# Patient Record
Sex: MALE | Race: BLACK OR AFRICAN AMERICAN | URBAN - METROPOLITAN AREA
[De-identification: names, ages, dates, MRNs, and addresses within clinical notes are randomized per-mention and may not be internally consistent; named-entity substitution may affect disease eponyms.]

---

## 2018-02-02 ENCOUNTER — APPOINTMENT (OUTPATIENT)
Dept: GENERAL RADIOLOGY | Age: 58
DRG: 246 | End: 2018-02-02

## 2018-02-02 ENCOUNTER — HOSPITAL ENCOUNTER (INPATIENT)
Age: 58
LOS: 5 days | Discharge: HOME | DRG: 246 | End: 2018-02-07
Attending: EMERGENCY MEDICINE | Admitting: INTERNAL MEDICINE

## 2018-02-02 DIAGNOSIS — R57.0 CARDIOGENIC SHOCK (HCC): ICD-10-CM

## 2018-02-02 DIAGNOSIS — I21.02 ST ELEVATION MYOCARDIAL INFARCTION INVOLVING LEFT ANTERIOR DESCENDING (LAD) CORONARY ARTERY (HCC): ICD-10-CM

## 2018-02-02 DIAGNOSIS — I21.09 ACUTE ST ELEVATION MYOCARDIAL INFARCTION (STEMI) OF ANTEROLATERAL WALL (HCC): Primary | ICD-10-CM

## 2018-02-02 PROBLEM — I21.3 STEMI (ST ELEVATION MYOCARDIAL INFARCTION) (HCC): Status: ACTIVE | Noted: 2018-02-02

## 2018-02-02 LAB
% CKMB: 1.2 % (ref 0–3.5)
-: ABNORMAL
ABSOLUTE EOS #: 0.09 K/UL (ref 0–0.4)
ABSOLUTE IMMATURE GRANULOCYTE: ABNORMAL K/UL (ref 0–0.3)
ABSOLUTE LYMPH #: 3.04 K/UL (ref 1–4.8)
ABSOLUTE MONO #: 0.37 K/UL (ref 0.2–0.8)
ALBUMIN SERPL-MCNC: 3.4 G/DL (ref 3.5–5.2)
ALBUMIN/GLOBULIN RATIO: ABNORMAL (ref 1–2.5)
ALP BLD-CCNC: 68 U/L (ref 40–129)
ALT SERPL-CCNC: 54 U/L (ref 5–41)
AMORPHOUS: ABNORMAL
ANION GAP SERPL CALCULATED.3IONS-SCNC: 16 MMOL/L (ref 9–17)
ANION GAP SERPL CALCULATED.3IONS-SCNC: 17 MMOL/L (ref 9–17)
ANION GAP: 22 MMOL/L (ref 8–16)
AST SERPL-CCNC: 216 U/L
BACTERIA: ABNORMAL
BASOPHILS # BLD: 0 %
BASOPHILS ABSOLUTE: 0 K/UL (ref 0–0.2)
BILIRUB SERPL-MCNC: 0.46 MG/DL (ref 0.3–1.2)
BILIRUBIN URINE: NEGATIVE
BNP INTERPRETATION: NORMAL
BUN BLDV-MCNC: 12 MG/DL (ref 6–20)
BUN BLDV-MCNC: 12 MG/DL (ref 6–20)
BUN/CREAT BLD: 11 (ref 9–20)
BUN/CREAT BLD: 8 (ref 9–20)
CALCIUM SERPL-MCNC: 7.2 MG/DL (ref 8.6–10.4)
CALCIUM SERPL-MCNC: 8.7 MG/DL (ref 8.6–10.4)
CASTS UA: ABNORMAL /LPF
CHLORIDE BLD-SCNC: 101 MMOL/L (ref 98–107)
CHLORIDE BLD-SCNC: 105 MMOL/L (ref 98–107)
CK MB: 4.5 NG/ML
CKMB INTERPRETATION: ABNORMAL
CO2: 20 MMOL/L (ref 20–31)
CO2: 23 MMOL/L (ref 20–31)
COLOR: YELLOW
COMMENT UA: ABNORMAL
CREAT SERPL-MCNC: 1.14 MG/DL (ref 0.7–1.2)
CREAT SERPL-MCNC: 1.47 MG/DL (ref 0.7–1.2)
CRYSTALS, UA: ABNORMAL /HPF
DIFFERENTIAL TYPE: ABNORMAL
EKG ATRIAL RATE: 92 BPM
EKG P AXIS: 78 DEGREES
EKG P-R INTERVAL: 180 MS
EKG Q-T INTERVAL: 400 MS
EKG QRS DURATION: 156 MS
EKG QTC CALCULATION (BAZETT): 494 MS
EKG R AXIS: 90 DEGREES
EKG T AXIS: 55 DEGREES
EKG VENTRICULAR RATE: 92 BPM
EOSINOPHILS RELATIVE PERCENT: 2 % (ref 1–4)
EPITHELIAL CELLS UA: ABNORMAL /HPF (ref 0–5)
FIBRINOGEN: 276 MG/DL (ref 170–400)
FIO2: 100
FIO2: 100
GFR AFRICAN AMERICAN: 60 ML/MIN
GFR AFRICAN AMERICAN: >60 ML/MIN
GFR NON-AFRICAN AMERICAN: 49 ML/MIN
GFR NON-AFRICAN AMERICAN: >60 ML/MIN
GFR SERPL CREATININE-BSD FRML MDRD: ABNORMAL ML/MIN/{1.73_M2}
GLUCOSE BLD-MCNC: 139 MG/DL (ref 74–106)
GLUCOSE BLD-MCNC: 140 MG/DL (ref 70–99)
GLUCOSE BLD-MCNC: 217 MG/DL (ref 74–106)
GLUCOSE BLD-MCNC: 233 MG/DL (ref 70–99)
GLUCOSE URINE: NEGATIVE
HCT VFR BLD CALC: 42.5 % (ref 41–53)
HEMOGLOBIN: 13.7 G/DL (ref 13.5–17.5)
IMMATURE GRANULOCYTES: ABNORMAL %
INR BLD: 1.1
KETONES, URINE: ABNORMAL
LACTIC ACID, WHOLE BLOOD: ABNORMAL MMOL/L (ref 0.7–2.1)
LACTIC ACID: 2.3 MMOL/L (ref 0.5–2.2)
LEUKOCYTE ESTERASE, URINE: NEGATIVE
LYMPHOCYTES # BLD: 66 % (ref 24–44)
MAGNESIUM: 1.7 MG/DL (ref 1.6–2.6)
MCH RBC QN AUTO: 30.4 PG (ref 26–34)
MCHC RBC AUTO-ENTMCNC: 32.3 G/DL (ref 31–37)
MCV RBC AUTO: 94.3 FL (ref 80–100)
MONOCYTES # BLD: 8 % (ref 1–7)
MUCUS: ABNORMAL
NEGATIVE BASE EXCESS, ART: 4 (ref 0–2)
NEGATIVE BASE EXCESS, ART: 5 (ref 0–2)
NITRITE, URINE: NEGATIVE
NRBC AUTOMATED: ABNORMAL PER 100 WBC
O2 DEVICE/FLOW/%: ABNORMAL
O2 DEVICE/FLOW/%: ABNORMAL
OTHER OBSERVATIONS UA: ABNORMAL
PARTIAL THROMBOPLASTIN TIME: 21.5 SEC (ref 23–31)
PATIENT TEMP: 97.2
PATIENT TEMP: ABNORMAL
PDW BLD-RTO: 13.7 % (ref 11.5–14.5)
PH UA: 5 (ref 5–8)
PLATELET # BLD: 212 K/UL (ref 130–400)
PLATELET ESTIMATE: ABNORMAL
PMV BLD AUTO: 9.3 FL (ref 6–12)
POC BUN: 12 MG/DL (ref 6–20)
POC CHLORIDE: 103 MMOL/L (ref 98–110)
POC CREATININE: 1.5 MG/DL (ref 0.6–1.4)
POC HCO3: 21.5 MMOL/L (ref 22–27)
POC HCO3: 21.5 MMOL/L (ref 22–27)
POC HEMATOCRIT: 40 % (ref 41–53)
POC HEMATOCRIT: 44 % (ref 41–53)
POC HEMOGLOBIN: 13.6 G/DL (ref 13.5–17.5)
POC HEMOGLOBIN: 15 G/DL (ref 13.5–17.5)
POC IONIZED CALCIUM: 1.05 MMOL/L (ref 1.13–1.33)
POC IONIZED CALCIUM: 1.08 MMOL/L (ref 1.13–1.33)
POC O2 SATURATION: 77 %
POC O2 SATURATION: 94 %
POC PCO2 TEMP: ABNORMAL MM HG
POC PCO2 TEMP: ABNORMAL MM HG
POC PCO2: 40 MM HG (ref 32–45)
POC PCO2: 46 MM HG (ref 32–45)
POC PH TEMP: ABNORMAL
POC PH TEMP: ABNORMAL
POC PH: 7.28 (ref 7.35–7.45)
POC PH: 7.34 (ref 7.35–7.45)
POC PO2 TEMP: ABNORMAL MM HG
POC PO2 TEMP: ABNORMAL MM HG
POC PO2: 47 MM HG (ref 75–95)
POC PO2: 75 MM HG (ref 75–95)
POC POTASSIUM: 3.2 MMOL/L (ref 3.5–5.1)
POC POTASSIUM: 3.3 MMOL/L (ref 3.5–5.1)
POC SODIUM: 143 MMOL/L (ref 136–145)
POC SODIUM: 144 MMOL/L (ref 136–145)
POC TCO2: 22 MMOL/L (ref 20–31)
POC TROPONIN I: 0 NG/ML (ref 0–0.1)
POC TROPONIN INTERP: NORMAL
POSITIVE BASE EXCESS, ART: ABNORMAL (ref 0–2)
POSITIVE BASE EXCESS, ART: ABNORMAL (ref 0–2)
POTASSIUM SERPL-SCNC: 3.3 MMOL/L (ref 3.7–5.3)
POTASSIUM SERPL-SCNC: 3.4 MMOL/L (ref 3.7–5.3)
PRO-BNP: 78 PG/ML
PROTEIN UA: ABNORMAL
PROTHROMBIN TIME: 11 SEC (ref 9.7–11.6)
RBC # BLD: 4.51 M/UL (ref 4.5–5.9)
RBC # BLD: ABNORMAL 10*6/UL
RBC UA: ABNORMAL /HPF (ref 0–2)
RENAL EPITHELIAL, UA: ABNORMAL /HPF
SEG NEUTROPHILS: 24 % (ref 36–66)
SEGMENTED NEUTROPHILS ABSOLUTE COUNT: 1.1 K/UL (ref 1.8–7.7)
SODIUM BLD-SCNC: 141 MMOL/L (ref 135–144)
SODIUM BLD-SCNC: 141 MMOL/L (ref 135–144)
SPECIFIC GRAVITY UA: 1.01 (ref 1–1.03)
TCO2 (CALC), ART: 23 MMOL/L (ref 23–28)
TCO2 (CALC), ART: 23 MMOL/L (ref 23–28)
TOTAL CK: 387 U/L (ref 39–308)
TOTAL PROTEIN: 5.6 G/DL (ref 6.4–8.3)
TRICHOMONAS: ABNORMAL
TROPONIN INTERP: ABNORMAL
TROPONIN INTERP: NORMAL
TROPONIN T: 2.97 NG/ML
TROPONIN T: <0.03 NG/ML
TURBIDITY: ABNORMAL
URINE HGB: ABNORMAL
UROBILINOGEN, URINE: NORMAL
WBC # BLD: 4.6 K/UL (ref 3.5–11)
WBC # BLD: ABNORMAL 10*3/UL
WBC UA: ABNORMAL /HPF (ref 0–5)
YEAST: ABNORMAL

## 2018-02-02 PROCEDURE — 80047 BASIC METABLC PNL IONIZED CA: CPT

## 2018-02-02 PROCEDURE — B2111ZZ FLUOROSCOPY OF MULTIPLE CORONARY ARTERIES USING LOW OSMOLAR CONTRAST: ICD-10-PCS | Performed by: INTERNAL MEDICINE

## 2018-02-02 PROCEDURE — 82550 ASSAY OF CK (CPK): CPT

## 2018-02-02 PROCEDURE — 36415 COLL VENOUS BLD VENIPUNCTURE: CPT

## 2018-02-02 PROCEDURE — 85384 FIBRINOGEN ACTIVITY: CPT

## 2018-02-02 PROCEDURE — 6370000000 HC RX 637 (ALT 250 FOR IP): Performed by: INTERNAL MEDICINE

## 2018-02-02 PROCEDURE — 80048 BASIC METABOLIC PNL TOTAL CA: CPT

## 2018-02-02 PROCEDURE — 82553 CREATINE MB FRACTION: CPT

## 2018-02-02 PROCEDURE — 94761 N-INVAS EAR/PLS OXIMETRY MLT: CPT

## 2018-02-02 PROCEDURE — 83735 ASSAY OF MAGNESIUM: CPT

## 2018-02-02 PROCEDURE — 86920 COMPATIBILITY TEST SPIN: CPT

## 2018-02-02 PROCEDURE — 85014 HEMATOCRIT: CPT

## 2018-02-02 PROCEDURE — 82947 ASSAY GLUCOSE BLOOD QUANT: CPT

## 2018-02-02 PROCEDURE — 94770 HC ETCO2 MONITOR DAILY: CPT

## 2018-02-02 PROCEDURE — 4A023N7 MEASUREMENT OF CARDIAC SAMPLING AND PRESSURE, LEFT HEART, PERCUTANEOUS APPROACH: ICD-10-PCS | Performed by: INTERNAL MEDICINE

## 2018-02-02 PROCEDURE — 2500000003 HC RX 250 WO HCPCS

## 2018-02-02 PROCEDURE — 71045 X-RAY EXAM CHEST 1 VIEW: CPT

## 2018-02-02 PROCEDURE — 027034Z DILATION OF CORONARY ARTERY, ONE ARTERY WITH DRUG-ELUTING INTRALUMINAL DEVICE, PERCUTANEOUS APPROACH: ICD-10-PCS | Performed by: INTERNAL MEDICINE

## 2018-02-02 PROCEDURE — 86900 BLOOD TYPING SEROLOGIC ABO: CPT

## 2018-02-02 PROCEDURE — 36556 INSERT NON-TUNNEL CV CATH: CPT

## 2018-02-02 PROCEDURE — 6360000002 HC RX W HCPCS: Performed by: EMERGENCY MEDICINE

## 2018-02-02 PROCEDURE — 93458 L HRT ARTERY/VENTRICLE ANGIO: CPT | Performed by: INTERNAL MEDICINE

## 2018-02-02 PROCEDURE — 93005 ELECTROCARDIOGRAM TRACING: CPT

## 2018-02-02 PROCEDURE — 2580000003 HC RX 258: Performed by: INTERNAL MEDICINE

## 2018-02-02 PROCEDURE — 84132 ASSAY OF SERUM POTASSIUM: CPT

## 2018-02-02 PROCEDURE — 2580000003 HC RX 258

## 2018-02-02 PROCEDURE — 6360000002 HC RX W HCPCS

## 2018-02-02 PROCEDURE — 85730 THROMBOPLASTIN TIME PARTIAL: CPT

## 2018-02-02 PROCEDURE — 6360000002 HC RX W HCPCS: Performed by: INTERNAL MEDICINE

## 2018-02-02 PROCEDURE — 02HV33Z INSERTION OF INFUSION DEVICE INTO SUPERIOR VENA CAVA, PERCUTANEOUS APPROACH: ICD-10-PCS | Performed by: INTERNAL MEDICINE

## 2018-02-02 PROCEDURE — 85610 PROTHROMBIN TIME: CPT

## 2018-02-02 PROCEDURE — 2000000000 HC ICU R&B

## 2018-02-02 PROCEDURE — 96374 THER/PROPH/DIAG INJ IV PUSH: CPT

## 2018-02-02 PROCEDURE — 92941 PRQ TRLML REVSC TOT OCCL AMI: CPT | Performed by: INTERNAL MEDICINE

## 2018-02-02 PROCEDURE — 0BH17EZ INSERTION OF ENDOTRACHEAL AIRWAY INTO TRACHEA, VIA NATURAL OR ARTIFICIAL OPENING: ICD-10-PCS | Performed by: INTERNAL MEDICINE

## 2018-02-02 PROCEDURE — 2500000003 HC RX 250 WO HCPCS: Performed by: INTERNAL MEDICINE

## 2018-02-02 PROCEDURE — 36620 INSERTION CATHETER ARTERY: CPT

## 2018-02-02 PROCEDURE — 84295 ASSAY OF SERUM SODIUM: CPT

## 2018-02-02 PROCEDURE — 83880 ASSAY OF NATRIURETIC PEPTIDE: CPT

## 2018-02-02 PROCEDURE — 2580000003 HC RX 258: Performed by: EMERGENCY MEDICINE

## 2018-02-02 PROCEDURE — 82803 BLOOD GASES ANY COMBINATION: CPT

## 2018-02-02 PROCEDURE — 36600 WITHDRAWAL OF ARTERIAL BLOOD: CPT

## 2018-02-02 PROCEDURE — 6360000004 HC RX CONTRAST MEDICATION

## 2018-02-02 PROCEDURE — 86850 RBC ANTIBODY SCREEN: CPT

## 2018-02-02 PROCEDURE — B2151ZZ FLUOROSCOPY OF LEFT HEART USING LOW OSMOLAR CONTRAST: ICD-10-PCS | Performed by: INTERNAL MEDICINE

## 2018-02-02 PROCEDURE — 5A1945Z RESPIRATORY VENTILATION, 24-96 CONSECUTIVE HOURS: ICD-10-PCS | Performed by: INTERNAL MEDICINE

## 2018-02-02 PROCEDURE — 99285 EMERGENCY DEPT VISIT HI MDM: CPT

## 2018-02-02 PROCEDURE — 80053 COMPREHEN METABOLIC PANEL: CPT

## 2018-02-02 PROCEDURE — 84484 ASSAY OF TROPONIN QUANT: CPT

## 2018-02-02 PROCEDURE — 82330 ASSAY OF CALCIUM: CPT

## 2018-02-02 PROCEDURE — 94002 VENT MGMT INPAT INIT DAY: CPT

## 2018-02-02 PROCEDURE — 86901 BLOOD TYPING SEROLOGIC RH(D): CPT

## 2018-02-02 PROCEDURE — 81001 URINALYSIS AUTO W/SCOPE: CPT

## 2018-02-02 PROCEDURE — 37799 UNLISTED PX VASCULAR SURGERY: CPT

## 2018-02-02 PROCEDURE — 85025 COMPLETE CBC W/AUTO DIFF WBC: CPT

## 2018-02-02 PROCEDURE — 83605 ASSAY OF LACTIC ACID: CPT

## 2018-02-02 RX ORDER — PROPOFOL 10 MG/ML
10 INJECTION, EMULSION INTRAVENOUS
Status: DISCONTINUED | OUTPATIENT
Start: 2018-02-02 | End: 2018-02-09 | Stop reason: HOSPADM

## 2018-02-02 RX ORDER — IPRATROPIUM BROMIDE AND ALBUTEROL SULFATE 2.5; .5 MG/3ML; MG/3ML
1 SOLUTION RESPIRATORY (INHALATION) 4 TIMES DAILY
Status: DISCONTINUED | OUTPATIENT
Start: 2018-02-02 | End: 2018-02-09 | Stop reason: HOSPADM

## 2018-02-02 RX ORDER — CARVEDILOL 3.12 MG/1
3.12 TABLET ORAL 2 TIMES DAILY WITH MEALS
Status: DISCONTINUED | OUTPATIENT
Start: 2018-02-02 | End: 2018-02-02

## 2018-02-02 RX ORDER — SODIUM CHLORIDE 0.9 % (FLUSH) 0.9 %
10 SYRINGE (ML) INJECTION PRN
Status: DISCONTINUED | OUTPATIENT
Start: 2018-02-02 | End: 2018-02-05 | Stop reason: SDUPTHER

## 2018-02-02 RX ORDER — ONDANSETRON 2 MG/ML
4 INJECTION INTRAMUSCULAR; INTRAVENOUS EVERY 6 HOURS PRN
Status: DISCONTINUED | OUTPATIENT
Start: 2018-02-02 | End: 2018-02-09 | Stop reason: HOSPADM

## 2018-02-02 RX ORDER — METHYLPREDNISOLONE SODIUM SUCCINATE 40 MG/ML
40 INJECTION, POWDER, LYOPHILIZED, FOR SOLUTION INTRAMUSCULAR; INTRAVENOUS EVERY 6 HOURS
Status: DISCONTINUED | OUTPATIENT
Start: 2018-02-02 | End: 2018-02-03

## 2018-02-02 RX ORDER — CALCIUM CHLORIDE 100 MG/ML
INJECTION INTRAVENOUS; INTRAVENTRICULAR
Status: COMPLETED
Start: 2018-02-02 | End: 2018-02-02

## 2018-02-02 RX ORDER — CALCIUM CHLORIDE 100 MG/ML
1 INJECTION INTRAVENOUS; INTRAVENTRICULAR ONCE
Status: COMPLETED | OUTPATIENT
Start: 2018-02-02 | End: 2018-02-02

## 2018-02-02 RX ORDER — ACETAMINOPHEN 325 MG/1
650 TABLET ORAL EVERY 4 HOURS PRN
Status: DISCONTINUED | OUTPATIENT
Start: 2018-02-02 | End: 2018-02-09 | Stop reason: HOSPADM

## 2018-02-02 RX ORDER — ASPIRIN 81 MG/1
81 TABLET, CHEWABLE ORAL DAILY
Status: DISCONTINUED | OUTPATIENT
Start: 2018-02-03 | End: 2018-02-02 | Stop reason: SDUPTHER

## 2018-02-02 RX ORDER — ONDANSETRON 2 MG/ML
4 INJECTION INTRAMUSCULAR; INTRAVENOUS EVERY 6 HOURS PRN
Status: DISCONTINUED | OUTPATIENT
Start: 2018-02-02 | End: 2018-02-05 | Stop reason: SDUPTHER

## 2018-02-02 RX ORDER — SODIUM CHLORIDE 9 MG/ML
INJECTION, SOLUTION INTRAVENOUS CONTINUOUS
Status: DISCONTINUED | OUTPATIENT
Start: 2018-02-02 | End: 2018-02-08

## 2018-02-02 RX ORDER — SODIUM CHLORIDE 0.9 % (FLUSH) 0.9 %
10 SYRINGE (ML) INJECTION EVERY 12 HOURS SCHEDULED
Status: DISCONTINUED | OUTPATIENT
Start: 2018-02-02 | End: 2018-02-05 | Stop reason: SDUPTHER

## 2018-02-02 RX ORDER — 0.9 % SODIUM CHLORIDE 0.9 %
2000 INTRAVENOUS SOLUTION INTRAVENOUS ONCE
Status: COMPLETED | OUTPATIENT
Start: 2018-02-02 | End: 2018-02-02

## 2018-02-02 RX ORDER — SODIUM CHLORIDE 0.9 % (FLUSH) 0.9 %
10 SYRINGE (ML) INJECTION EVERY 12 HOURS SCHEDULED
Status: DISCONTINUED | OUTPATIENT
Start: 2018-02-02 | End: 2018-02-09 | Stop reason: HOSPADM

## 2018-02-02 RX ORDER — ACETAMINOPHEN 325 MG/1
650 TABLET ORAL EVERY 4 HOURS PRN
Status: DISCONTINUED | OUTPATIENT
Start: 2018-02-02 | End: 2018-02-05 | Stop reason: SDUPTHER

## 2018-02-02 RX ORDER — FENTANYL CITRATE 50 UG/ML
50 INJECTION, SOLUTION INTRAMUSCULAR; INTRAVENOUS
Status: DISCONTINUED | OUTPATIENT
Start: 2018-02-02 | End: 2018-02-09 | Stop reason: HOSPADM

## 2018-02-02 RX ORDER — MIDAZOLAM HYDROCHLORIDE 1 MG/ML
INJECTION INTRAMUSCULAR; INTRAVENOUS
Status: COMPLETED
Start: 2018-02-02 | End: 2018-02-02

## 2018-02-02 RX ORDER — LOSARTAN POTASSIUM 25 MG/1
25 TABLET ORAL DAILY
Status: DISCONTINUED | OUTPATIENT
Start: 2018-02-03 | End: 2018-02-07

## 2018-02-02 RX ORDER — BUMETANIDE 0.25 MG/ML
1 INJECTION, SOLUTION INTRAMUSCULAR; INTRAVENOUS ONCE
Status: COMPLETED | OUTPATIENT
Start: 2018-02-02 | End: 2018-02-02

## 2018-02-02 RX ORDER — PROPOFOL 10 MG/ML
INJECTION, EMULSION INTRAVENOUS
Status: COMPLETED
Start: 2018-02-02 | End: 2018-02-02

## 2018-02-02 RX ORDER — ASPIRIN 81 MG/1
81 TABLET ORAL DAILY
Status: DISCONTINUED | OUTPATIENT
Start: 2018-02-02 | End: 2018-02-09 | Stop reason: HOSPADM

## 2018-02-02 RX ORDER — CALCIUM CHLORIDE 100 MG/ML
INJECTION INTRAVENOUS; INTRAVENTRICULAR
Status: DISPENSED
Start: 2018-02-02 | End: 2018-02-03

## 2018-02-02 RX ORDER — ATORVASTATIN CALCIUM 40 MG/1
40 TABLET, FILM COATED ORAL NIGHTLY
Status: DISCONTINUED | OUTPATIENT
Start: 2018-02-02 | End: 2018-02-09 | Stop reason: HOSPADM

## 2018-02-02 RX ORDER — 0.9 % SODIUM CHLORIDE 0.9 %
500 INTRAVENOUS SOLUTION INTRAVENOUS ONCE
Status: DISCONTINUED | OUTPATIENT
Start: 2018-02-02 | End: 2018-02-09 | Stop reason: HOSPADM

## 2018-02-02 RX ORDER — DOPAMINE HYDROCHLORIDE 160 MG/100ML
2.5 INJECTION, SOLUTION INTRAVENOUS CONTINUOUS
Status: DISCONTINUED | OUTPATIENT
Start: 2018-02-02 | End: 2018-02-09 | Stop reason: HOSPADM

## 2018-02-02 RX ORDER — SODIUM CHLORIDE 0.9 % (FLUSH) 0.9 %
10 SYRINGE (ML) INJECTION PRN
Status: DISCONTINUED | OUTPATIENT
Start: 2018-02-02 | End: 2018-02-09 | Stop reason: HOSPADM

## 2018-02-02 RX ORDER — POTASSIUM CHLORIDE 7.45 MG/ML
10 INJECTION INTRAVENOUS ONCE
Status: DISCONTINUED | OUTPATIENT
Start: 2018-02-02 | End: 2018-02-02 | Stop reason: SDUPTHER

## 2018-02-02 RX ORDER — HEPARIN SODIUM 5000 [USP'U]/ML
5000 INJECTION, SOLUTION INTRAVENOUS; SUBCUTANEOUS ONCE
Status: COMPLETED | OUTPATIENT
Start: 2018-02-02 | End: 2018-02-02

## 2018-02-02 RX ORDER — POTASSIUM CHLORIDE 29.8 MG/ML
20 INJECTION INTRAVENOUS ONCE
Status: COMPLETED | OUTPATIENT
Start: 2018-02-02 | End: 2018-02-02

## 2018-02-02 RX ADMIN — TICAGRELOR 180 MG: 90 TABLET ORAL at 21:52

## 2018-02-02 RX ADMIN — Medication 10 ML: at 21:53

## 2018-02-02 RX ADMIN — CALCIUM CHLORIDE 1 G: 100 INJECTION, SOLUTION INTRAVENOUS at 19:31

## 2018-02-02 RX ADMIN — BUMETANIDE 1 MG: 0.25 INJECTION INTRAMUSCULAR; INTRAVENOUS at 21:51

## 2018-02-02 RX ADMIN — NOREPINEPHRINE BITARTRATE 12 MCG/MIN: 1 INJECTION, SOLUTION, CONCENTRATE INTRAVENOUS at 18:28

## 2018-02-02 RX ADMIN — HEPARIN SODIUM 5000 UNITS: 5000 INJECTION, SOLUTION INTRAVENOUS; SUBCUTANEOUS at 16:01

## 2018-02-02 RX ADMIN — METHYLPREDNISOLONE SODIUM SUCCINATE 40 MG: 40 INJECTION, POWDER, FOR SOLUTION INTRAMUSCULAR; INTRAVENOUS at 21:51

## 2018-02-02 RX ADMIN — DOPAMINE HYDROCHLORIDE 8 MCG/KG/MIN: 160 INJECTION, SOLUTION INTRAVENOUS at 18:25

## 2018-02-02 RX ADMIN — POTASSIUM CHLORIDE 10 MEQ: 2 INJECTION, SOLUTION, CONCENTRATE INTRAVENOUS at 17:00

## 2018-02-02 RX ADMIN — MIDAZOLAM HYDROCHLORIDE 2 MG: 1 INJECTION INTRAMUSCULAR; INTRAVENOUS at 19:30

## 2018-02-02 RX ADMIN — ASPIRIN 81 MG: 81 TABLET, COATED ORAL at 21:51

## 2018-02-02 RX ADMIN — MIDAZOLAM 2 MG: 1 INJECTION INTRAMUSCULAR; INTRAVENOUS at 19:30

## 2018-02-02 RX ADMIN — SODIUM CHLORIDE 0.12 MCG/KG/MIN: 9 INJECTION, SOLUTION INTRAVENOUS at 17:29

## 2018-02-02 RX ADMIN — SODIUM CHLORIDE: 9 INJECTION, SOLUTION INTRAVENOUS at 18:25

## 2018-02-02 RX ADMIN — CALCIUM CHLORIDE 1 G: 100 INJECTION INTRAVENOUS; INTRAVENTRICULAR at 19:31

## 2018-02-02 RX ADMIN — Medication 10 ML: at 21:52

## 2018-02-02 RX ADMIN — PROPOFOL 20 MCG/KG/MIN: 10 INJECTION, EMULSION INTRAVENOUS at 18:26

## 2018-02-02 RX ADMIN — PROPOFOL 25 MCG/KG/MIN: 10 INJECTION, EMULSION INTRAVENOUS at 22:13

## 2018-02-02 RX ADMIN — ATORVASTATIN CALCIUM 40 MG: 40 TABLET, FILM COATED ORAL at 21:51

## 2018-02-02 RX ADMIN — POTASSIUM CHLORIDE 20 MEQ: 29.8 INJECTION, SOLUTION INTRAVENOUS at 21:52

## 2018-02-02 RX ADMIN — AMIODARONE HYDROCHLORIDE 1 MG/MIN: 1.8 INJECTION, SOLUTION INTRAVENOUS at 18:00

## 2018-02-02 RX ADMIN — SODIUM CHLORIDE 2000 ML: 9 INJECTION, SOLUTION INTRAVENOUS at 16:01

## 2018-02-02 RX ADMIN — AMIODARONE HYDROCHLORIDE 1 MG/MIN: 1.8 INJECTION, SOLUTION INTRAVENOUS at 22:45

## 2018-02-02 RX ADMIN — TAZOBACTAM SODIUM AND PIPERACILLIN SODIUM 3.38 G: 375; 3 INJECTION, SOLUTION INTRAVENOUS at 21:52

## 2018-02-02 RX ADMIN — FENTANYL CITRATE 50 MCG: 50 INJECTION INTRAMUSCULAR; INTRAVENOUS at 21:50

## 2018-02-02 ASSESSMENT — PAIN SCALES - GENERAL: PAINLEVEL_OUTOF10: 6

## 2018-02-02 ASSESSMENT — PULMONARY FUNCTION TESTS
PIF_VALUE: 23
PIF_VALUE: 25

## 2018-02-02 NOTE — ED PROVIDER NOTES
Alcohol use Not on file    Drug use: Unknown    Sexual activity: Not on file     Other Topics Concern    Not on file     Social History Narrative    No narrative on file       SCREENINGS             PHYSICAL EXAM    (up to 7 for level 4, 8 or more for level 5)     ED Triage Vitals [02/02/18 1600]   BP Temp Temp src Pulse Resp SpO2 Height Weight   -- -- -- -- -- -- 6' 5\" (1.956 m) 245 lb (111.1 kg)       Physical Exam   Constitutional: He is oriented to person, place, and time. Anxious, distressed, diaphoretic and hypotensive. HENT:   Head: Normocephalic. Right Ear: External ear normal.   Left Ear: External ear normal.   Nose: Nose normal.   Mouth/Throat: Oropharynx is clear and moist.   Eyes: EOM are normal. Pupils are equal, round, and reactive to light. Neck: Neck supple. Cardiovascular: Regular rhythm. Tachycardia present. Exam reveals distant heart sounds. No murmur heard. Pulmonary/Chest: Effort normal and breath sounds normal. He has no rhonchi. He has no rales. Abdominal: Normal appearance. He exhibits no distension and no mass. There is no hepatosplenomegaly. There is no tenderness. Musculoskeletal: Normal range of motion. He exhibits no edema or tenderness. Neurological: He is alert and oriented to person, place, and time. No cranial nerve deficit. He exhibits normal muscle tone. Skin: Skin is warm. No rash noted. He is diaphoretic. No pallor. Nursing note and vitals reviewed. DIAGNOSTIC RESULTS     EKG: All EKG's are interpreted by the Emergency Department Physician who either signs or Co-signs this chart in the absence of a cardiologist.    Sinus rhythm with a rate of 92 beats a minute. Right bundle branch block pattern. Acute ST elevation in the anteriolateral and high lateral leads.     RADIOLOGY:   Non-plain film images such as CT, Ultrasound and MRI are read by the radiologist. Plain radiographic images are visualized and preliminarily interpreted by the emergency

## 2018-02-02 NOTE — ED NOTES
Bed: 24  Expected date: 2/2/18  Expected time: 3:54 PM  [facility]  Comments:  [facility]     Dandy Boot  02/02/18 1600

## 2018-02-02 NOTE — H&P
18  Cardiology History & Physical     Name:   Lenin Portillo :  1960   MRN:   [id number] Gender:   male   PCP:  No primary care provider on file. Age: 62 y.o. PCP Fax:  None     Hospital: [facility] Room Number: STA SKY/SKY      History Obtained From:  patient, ER and EMS records    Chief Complaint   Patient presents with    Chest Pain     History of Present Illness:    62 y. o. Black male who developed chest pain while running on the treadmill. According to the emergency department he presented to the ER within 15 minutes of developing the pain. The initial electrocardiogram showed diffuse ST elevation worrisome for an acute left main occlusion. Follow-up EKG was slightly improved showing resolution of the ST elevation in the inferior lateral leads. The patient is being sent emergently to the cardiac cath lab for cardiac catheterization. He continues to have pain. He did have some runs of nonsustained ventricular tachycardia and was started on amiodarone and a drip was initiated    History & System Review: Allergies as of 2018    (Not on File)       Prior to Admission medications    Not on File       Patient Active Problem List   Diagnosis    STEMI (ST elevation myocardial infarction) [facility])       He  has a past medical history of Dementia. He  has no past surgical history on file. His family history is not on file. He has no family status information on file.        Review of Systems  Unobtainable due to emergent nature presentation      Physical Examination:     BP 89/63   Ht 6' 5\" (1.956 m)   Wt 245 lb (111.1 kg)   BMI 29.05 kg/m²   CONSTITUTIONAL:  awake, alert, cooperative, in mild distress  HEAD: Atraumatic, normocephalic  EYES:  Lids and lashes normal, pupils equal, round and reactive to light, extra ocular muscles intact, sclera clear, conjunctiva normal  ENT:  Normocephalic, without obvious abnormality, atraumatic, sinuses nontender on palpation, external ears

## 2018-02-03 ENCOUNTER — APPOINTMENT (OUTPATIENT)
Dept: GENERAL RADIOLOGY | Age: 58
DRG: 246 | End: 2018-02-03

## 2018-02-03 PROBLEM — R57.0 CARDIOGENIC SHOCK (HCC): Status: ACTIVE | Noted: 2018-02-03

## 2018-02-03 PROBLEM — I47.20 VENTRICULAR TACHYCARDIA: Status: ACTIVE | Noted: 2018-02-03

## 2018-02-03 PROBLEM — Z87.891 FORMER SMOKER: Chronic | Status: ACTIVE | Noted: 2018-02-03

## 2018-02-03 PROBLEM — K72.00 SHOCK LIVER: Status: ACTIVE | Noted: 2018-02-03

## 2018-02-03 PROBLEM — E87.20 LACTIC ACIDOSIS: Status: ACTIVE | Noted: 2018-02-03

## 2018-02-03 LAB
ABSOLUTE EOS #: 0 K/UL (ref 0–0.4)
ABSOLUTE IMMATURE GRANULOCYTE: ABNORMAL K/UL (ref 0–0.3)
ABSOLUTE LYMPH #: 0.5 K/UL (ref 1–4.8)
ABSOLUTE MONO #: 0.3 K/UL (ref 0.2–0.8)
ALBUMIN SERPL-MCNC: 3.2 G/DL (ref 3.5–5.2)
ALBUMIN/GLOBULIN RATIO: ABNORMAL (ref 1–2.5)
ALP BLD-CCNC: 65 U/L (ref 40–129)
ALT SERPL-CCNC: 146 U/L (ref 5–41)
AMPHETAMINE SCREEN URINE: NEGATIVE
ANION GAP SERPL CALCULATED.3IONS-SCNC: 11 MMOL/L (ref 9–17)
AST SERPL-CCNC: 815 U/L
BARBITURATE SCREEN URINE: NEGATIVE
BASOPHILS # BLD: 0 % (ref 0–2)
BASOPHILS ABSOLUTE: 0 K/UL (ref 0–0.2)
BENZODIAZEPINE SCREEN, URINE: POSITIVE
BILIRUB SERPL-MCNC: 0.26 MG/DL (ref 0.3–1.2)
BILIRUBIN DIRECT: 0.09 MG/DL
BILIRUBIN, INDIRECT: 0.17 MG/DL (ref 0–1)
BUN BLDV-MCNC: 14 MG/DL (ref 6–20)
BUN/CREAT BLD: 11 (ref 9–20)
BUPRENORPHINE URINE: ABNORMAL
CALCIUM SERPL-MCNC: 8.6 MG/DL (ref 8.6–10.4)
CANNABINOID SCREEN URINE: NEGATIVE
CHLORIDE BLD-SCNC: 109 MMOL/L (ref 98–107)
CO2: 20 MMOL/L (ref 20–31)
COCAINE METABOLITE, URINE: NEGATIVE
CREAT SERPL-MCNC: 1.24 MG/DL (ref 0.7–1.2)
DIFFERENTIAL TYPE: ABNORMAL
EOSINOPHILS RELATIVE PERCENT: 0 % (ref 1–4)
FIO2: 100
FIO2: 70
FIO2: 90
GFR AFRICAN AMERICAN: >60 ML/MIN
GFR NON-AFRICAN AMERICAN: >60 ML/MIN
GFR SERPL CREATININE-BSD FRML MDRD: ABNORMAL ML/MIN/{1.73_M2}
GFR SERPL CREATININE-BSD FRML MDRD: ABNORMAL ML/MIN/{1.73_M2}
GLOBULIN: ABNORMAL G/DL (ref 1.5–3.8)
GLUCOSE BLD-MCNC: 100 MG/DL (ref 70–99)
HCT VFR BLD CALC: 43.3 % (ref 41–53)
HEMOGLOBIN: 14 G/DL (ref 13.5–17.5)
IMMATURE GRANULOCYTES: ABNORMAL %
LACTIC ACID: 2.3 MMOL/L (ref 0.5–2.2)
LV EF: 20 %
LVEF MODALITY: NORMAL
LYMPHOCYTES # BLD: 5 % (ref 24–44)
MAGNESIUM: 1.8 MG/DL (ref 1.6–2.6)
MAGNESIUM: 2 MG/DL (ref 1.6–2.6)
MCH RBC QN AUTO: 30.4 PG (ref 26–34)
MCHC RBC AUTO-ENTMCNC: 32.5 G/DL (ref 31–37)
MCV RBC AUTO: 93.6 FL (ref 80–100)
MDMA URINE: ABNORMAL
METHADONE SCREEN, URINE: NEGATIVE
METHAMPHETAMINE, URINE: ABNORMAL
MONOCYTES # BLD: 3 % (ref 1–7)
NEGATIVE BASE EXCESS, ART: 3 (ref 0–2)
NEGATIVE BASE EXCESS, ART: 3 (ref 0–2)
NEGATIVE BASE EXCESS, ART: 4 (ref 0–2)
NRBC AUTOMATED: ABNORMAL PER 100 WBC
O2 DEVICE/FLOW/%: ABNORMAL
OPIATES, URINE: POSITIVE
OXYCODONE SCREEN URINE: NEGATIVE
PATIENT TEMP: 97
PATIENT TEMP: 97.3
PATIENT TEMP: 97.7
PDW BLD-RTO: 13.7 % (ref 11.5–14.5)
PHENCYCLIDINE, URINE: NEGATIVE
PLATELET # BLD: 211 K/UL (ref 130–400)
PLATELET ESTIMATE: ABNORMAL
PMV BLD AUTO: 9.2 FL (ref 6–12)
POC HCO3: 20.9 MMOL/L (ref 22–27)
POC HCO3: 21.5 MMOL/L (ref 22–27)
POC HCO3: 21.9 MMOL/L (ref 22–27)
POC O2 SATURATION: 100 %
POC O2 SATURATION: 99 %
POC O2 SATURATION: 99 %
POC PCO2 TEMP: ABNORMAL MM HG
POC PCO2: 31 MM HG (ref 32–45)
POC PCO2: 34 MM HG (ref 32–45)
POC PCO2: 39 MM HG (ref 32–45)
POC PH TEMP: ABNORMAL
POC PH: 7.36 (ref 7.35–7.45)
POC PH: 7.42 (ref 7.35–7.45)
POC PH: 7.44 (ref 7.35–7.45)
POC PO2 TEMP: ABNORMAL MM HG
POC PO2: 133 MM HG (ref 75–95)
POC PO2: 155 MM HG (ref 75–95)
POC PO2: 208 MM HG (ref 75–95)
POSITIVE BASE EXCESS, ART: ABNORMAL (ref 0–2)
POTASSIUM SERPL-SCNC: 4.7 MMOL/L (ref 3.7–5.3)
POTASSIUM SERPL-SCNC: 5.6 MMOL/L (ref 3.7–5.3)
POTASSIUM SERPL-SCNC: 5.9 MMOL/L (ref 3.7–5.3)
PROPOXYPHENE, URINE: ABNORMAL
RBC # BLD: 4.62 M/UL (ref 4.5–5.9)
RBC # BLD: ABNORMAL 10*6/UL
SEG NEUTROPHILS: 92 % (ref 36–66)
SEGMENTED NEUTROPHILS ABSOLUTE COUNT: 10.1 K/UL (ref 1.8–7.7)
SODIUM BLD-SCNC: 140 MMOL/L (ref 135–144)
TCO2 (CALC), ART: 22 MMOL/L (ref 23–28)
TCO2 (CALC), ART: 23 MMOL/L (ref 23–28)
TCO2 (CALC), ART: 23 MMOL/L (ref 23–28)
TEST INFORMATION: ABNORMAL
TOTAL PROTEIN: 5.8 G/DL (ref 6.4–8.3)
TRICYCLIC ANTIDEPRESSANTS, UR: ABNORMAL
TROPONIN INTERP: ABNORMAL
TROPONIN T: 17.22 NG/ML
WBC # BLD: 11 K/UL (ref 3.5–11)
WBC # BLD: ABNORMAL 10*3/UL

## 2018-02-03 PROCEDURE — 80048 BASIC METABOLIC PNL TOTAL CA: CPT

## 2018-02-03 PROCEDURE — 83735 ASSAY OF MAGNESIUM: CPT

## 2018-02-03 PROCEDURE — 6360000002 HC RX W HCPCS: Performed by: INTERNAL MEDICINE

## 2018-02-03 PROCEDURE — 71045 X-RAY EXAM CHEST 1 VIEW: CPT

## 2018-02-03 PROCEDURE — 94640 AIRWAY INHALATION TREATMENT: CPT

## 2018-02-03 PROCEDURE — 99222 1ST HOSP IP/OBS MODERATE 55: CPT | Performed by: INTERNAL MEDICINE

## 2018-02-03 PROCEDURE — 6370000000 HC RX 637 (ALT 250 FOR IP): Performed by: INTERNAL MEDICINE

## 2018-02-03 PROCEDURE — 37799 UNLISTED PX VASCULAR SURGERY: CPT

## 2018-02-03 PROCEDURE — 94761 N-INVAS EAR/PLS OXIMETRY MLT: CPT

## 2018-02-03 PROCEDURE — 36415 COLL VENOUS BLD VENIPUNCTURE: CPT

## 2018-02-03 PROCEDURE — 85025 COMPLETE CBC W/AUTO DIFF WBC: CPT

## 2018-02-03 PROCEDURE — 2500000003 HC RX 250 WO HCPCS: Performed by: INTERNAL MEDICINE

## 2018-02-03 PROCEDURE — 2580000003 HC RX 258: Performed by: INTERNAL MEDICINE

## 2018-02-03 PROCEDURE — 80076 HEPATIC FUNCTION PANEL: CPT

## 2018-02-03 PROCEDURE — 2000000000 HC ICU R&B

## 2018-02-03 PROCEDURE — 93306 TTE W/DOPPLER COMPLETE: CPT

## 2018-02-03 PROCEDURE — 84484 ASSAY OF TROPONIN QUANT: CPT

## 2018-02-03 PROCEDURE — 93005 ELECTROCARDIOGRAM TRACING: CPT

## 2018-02-03 PROCEDURE — 94003 VENT MGMT INPAT SUBQ DAY: CPT

## 2018-02-03 PROCEDURE — 80307 DRUG TEST PRSMV CHEM ANLYZR: CPT

## 2018-02-03 PROCEDURE — 83605 ASSAY OF LACTIC ACID: CPT

## 2018-02-03 PROCEDURE — 84132 ASSAY OF SERUM POTASSIUM: CPT

## 2018-02-03 PROCEDURE — 82803 BLOOD GASES ANY COMBINATION: CPT

## 2018-02-03 PROCEDURE — 94770 HC ETCO2 MONITOR DAILY: CPT

## 2018-02-03 RX ORDER — SODIUM POLYSTYRENE SULFONATE 15 G/60ML
15 SUSPENSION ORAL; RECTAL ONCE
Status: COMPLETED | OUTPATIENT
Start: 2018-02-03 | End: 2018-02-03

## 2018-02-03 RX ORDER — DEXTROSE MONOHYDRATE 25 G/50ML
25 INJECTION, SOLUTION INTRAVENOUS PRN
Status: DISCONTINUED | OUTPATIENT
Start: 2018-02-03 | End: 2018-02-09 | Stop reason: HOSPADM

## 2018-02-03 RX ORDER — MIDAZOLAM HYDROCHLORIDE 1 MG/ML
2 INJECTION INTRAMUSCULAR; INTRAVENOUS ONCE
Status: COMPLETED | OUTPATIENT
Start: 2018-02-02 | End: 2018-02-02

## 2018-02-03 RX ORDER — METHYLPREDNISOLONE SODIUM SUCCINATE 40 MG/ML
40 INJECTION, POWDER, LYOPHILIZED, FOR SOLUTION INTRAMUSCULAR; INTRAVENOUS EVERY 8 HOURS
Status: DISCONTINUED | OUTPATIENT
Start: 2018-02-03 | End: 2018-02-04

## 2018-02-03 RX ORDER — SODIUM POLYSTYRENE SULFONATE 15 G/60ML
15 SUSPENSION ORAL; RECTAL ONCE
Status: DISCONTINUED | OUTPATIENT
Start: 2018-02-03 | End: 2018-02-09 | Stop reason: HOSPADM

## 2018-02-03 RX ADMIN — TAZOBACTAM SODIUM AND PIPERACILLIN SODIUM 3.38 G: 375; 3 INJECTION, SOLUTION INTRAVENOUS at 20:45

## 2018-02-03 RX ADMIN — AMIODARONE HYDROCHLORIDE 0.5 MG/MIN: 1.8 INJECTION, SOLUTION INTRAVENOUS at 08:33

## 2018-02-03 RX ADMIN — TAZOBACTAM SODIUM AND PIPERACILLIN SODIUM 3.38 G: 375; 3 INJECTION, SOLUTION INTRAVENOUS at 14:45

## 2018-02-03 RX ADMIN — FENTANYL CITRATE 50 MCG: 50 INJECTION INTRAMUSCULAR; INTRAVENOUS at 10:57

## 2018-02-03 RX ADMIN — PROPOFOL 40 MCG/KG/MIN: 10 INJECTION, EMULSION INTRAVENOUS at 10:58

## 2018-02-03 RX ADMIN — Medication 10 ML: at 20:46

## 2018-02-03 RX ADMIN — FENTANYL CITRATE 50 MCG: 50 INJECTION INTRAMUSCULAR; INTRAVENOUS at 02:28

## 2018-02-03 RX ADMIN — ASPIRIN 81 MG: 81 TABLET, COATED ORAL at 11:20

## 2018-02-03 RX ADMIN — TICAGRELOR 90 MG: 90 TABLET ORAL at 11:20

## 2018-02-03 RX ADMIN — TAZOBACTAM SODIUM AND PIPERACILLIN SODIUM 3.38 G: 375; 3 INJECTION, SOLUTION INTRAVENOUS at 05:31

## 2018-02-03 RX ADMIN — METHYLPREDNISOLONE SODIUM SUCCINATE 40 MG: 40 INJECTION, POWDER, FOR SOLUTION INTRAMUSCULAR; INTRAVENOUS at 14:29

## 2018-02-03 RX ADMIN — DEXTROSE MONOHYDRATE 25 G: 500 INJECTION PARENTERAL at 03:24

## 2018-02-03 RX ADMIN — DOPAMINE HYDROCHLORIDE 4 MCG/KG/MIN: 160 INJECTION, SOLUTION INTRAVENOUS at 05:56

## 2018-02-03 RX ADMIN — IPRATROPIUM BROMIDE AND ALBUTEROL SULFATE 1 AMPULE: .5; 3 SOLUTION RESPIRATORY (INHALATION) at 15:40

## 2018-02-03 RX ADMIN — AMIODARONE HYDROCHLORIDE 0.5 MG/MIN: 1.8 INJECTION, SOLUTION INTRAVENOUS at 20:48

## 2018-02-03 RX ADMIN — PROPOFOL 45 MCG/KG/MIN: 10 INJECTION, EMULSION INTRAVENOUS at 21:53

## 2018-02-03 RX ADMIN — ATORVASTATIN CALCIUM 40 MG: 40 TABLET, FILM COATED ORAL at 20:45

## 2018-02-03 RX ADMIN — PROPOFOL 45 MCG/KG/MIN: 10 INJECTION, EMULSION INTRAVENOUS at 18:13

## 2018-02-03 RX ADMIN — PROPOFOL 30 MCG/KG/MIN: 10 INJECTION, EMULSION INTRAVENOUS at 07:52

## 2018-02-03 RX ADMIN — AMIODARONE HYDROCHLORIDE 0.5 MG/MIN: 1.8 INJECTION, SOLUTION INTRAVENOUS at 00:44

## 2018-02-03 RX ADMIN — METHYLPREDNISOLONE SODIUM SUCCINATE 40 MG: 40 INJECTION, POWDER, FOR SOLUTION INTRAMUSCULAR; INTRAVENOUS at 03:24

## 2018-02-03 RX ADMIN — NOREPINEPHRINE BITARTRATE 12 MCG/MIN: 1 INJECTION, SOLUTION, CONCENTRATE INTRAVENOUS at 11:19

## 2018-02-03 RX ADMIN — DOPAMINE HYDROCHLORIDE 3 MCG/KG/MIN: 160 INJECTION, SOLUTION INTRAVENOUS at 23:06

## 2018-02-03 RX ADMIN — METHYLPREDNISOLONE SODIUM SUCCINATE 40 MG: 40 INJECTION, POWDER, FOR SOLUTION INTRAMUSCULAR; INTRAVENOUS at 11:21

## 2018-02-03 RX ADMIN — IPRATROPIUM BROMIDE AND ALBUTEROL SULFATE 1 AMPULE: .5; 3 SOLUTION RESPIRATORY (INHALATION) at 08:01

## 2018-02-03 RX ADMIN — METHYLPREDNISOLONE SODIUM SUCCINATE 40 MG: 40 INJECTION, POWDER, FOR SOLUTION INTRAMUSCULAR; INTRAVENOUS at 20:46

## 2018-02-03 RX ADMIN — PROPOFOL 45 MCG/KG/MIN: 10 INJECTION, EMULSION INTRAVENOUS at 14:27

## 2018-02-03 RX ADMIN — VASOPRESSIN 0.01 UNITS/MIN: 20 INJECTION INTRAVENOUS at 14:29

## 2018-02-03 RX ADMIN — TICAGRELOR 90 MG: 90 TABLET ORAL at 20:45

## 2018-02-03 RX ADMIN — INSULIN HUMAN 10 UNITS: 100 INJECTION, SOLUTION PARENTERAL at 03:24

## 2018-02-03 RX ADMIN — FENTANYL CITRATE 50 MCG: 50 INJECTION INTRAMUSCULAR; INTRAVENOUS at 07:23

## 2018-02-03 RX ADMIN — IPRATROPIUM BROMIDE AND ALBUTEROL SULFATE 1 AMPULE: .5; 3 SOLUTION RESPIRATORY (INHALATION) at 11:53

## 2018-02-03 RX ADMIN — IPRATROPIUM BROMIDE AND ALBUTEROL SULFATE 1 AMPULE: .5; 3 SOLUTION RESPIRATORY (INHALATION) at 20:05

## 2018-02-03 RX ADMIN — SODIUM POLYSTYRENE SULFONATE 15 G: 15 SUSPENSION ORAL; RECTAL at 14:28

## 2018-02-03 RX ADMIN — PROPOFOL 30 MCG/KG/MIN: 10 INJECTION, EMULSION INTRAVENOUS at 03:28

## 2018-02-03 ASSESSMENT — PAIN SCALES - GENERAL
PAINLEVEL_OUTOF10: 0
PAINLEVEL_OUTOF10: 4
PAINLEVEL_OUTOF10: 0
PAINLEVEL_OUTOF10: 1
PAINLEVEL_OUTOF10: 0
PAINLEVEL_OUTOF10: 0
PAINLEVEL_OUTOF10: 3

## 2018-02-03 ASSESSMENT — PULMONARY FUNCTION TESTS
PIF_VALUE: 19
PIF_VALUE: 19
PIF_VALUE: 26
PIF_VALUE: 19
PIF_VALUE: 18
PIF_VALUE: 18
PIF_VALUE: 19

## 2018-02-03 NOTE — FLOWSHEET NOTE
02/03/18 [address] [facility]; Araujo care;Shayla care; Bathed; Soap and water   Araujo Care Soap and water   Oral Care Mouth swabbed;Mouth suctioned;Suction toothette   Skin Care Foam skin cleanser; Other (Comment)  (Zinc paste to buttocks and powder to skin and folds)   Level of Assistance Dependent   Comfort and Environment Interventions   Comfort Bed pad changed;Draw sheet changed;Gown changed; Complete linen change; Lotion;Powder;Repositioned;Pain med; Other (Comment)  (all linens changed due to diaphoresis )     Patient very moist and diaphoretic this am.  All linens changed, including gown, socks, and restraints. Patient repositioned for comfort. Zinc paste to buttocks and powder to back and creases for protection. Family remains at bedside. Will continue to reposition as needed. Pillow support and towel under head to protect from moisture.

## 2018-02-03 NOTE — FLOWSHEET NOTE
02/03/18 1336   Provider Notification   Reason for Communication Evaluate; Review case   Provider Name Dr. Soo Delarosa   Provider Notification Physician   Method of Communication Face to face   Response At bedside   Notification Time 1336     Dr. Soo Delarosa at bedside. Spoke with family at length. Orders for Kayexalate 15 mg now, Start Vasopressin and start weaning down on Levophed. We are to titrate drips to a MAP of 60. Once pressors are off or decreased enough he would like to try to extubate. Will likely wait until tomorrow. No further orders at this time.

## 2018-02-03 NOTE — CONSULTS
Collection Time: 02/02/18  4:05 PM   Result Value Ref Range    POC Glucose 139 (H) 74 - 106 mg/dL    POC BUN 12 6 - 20 mg/dL    POC Creatinine 1.5 (H) 0.6 - 1.4 mg/dL    POC Sodium 144 136 - 145 mmol/L    POC Potassium 3.3 (L) 3.5 - 5.1 mmol/L    POC Chloride 103 98 - 110 mmol/L    POC TCO2 22 20 - 31 mmol/L    Anion Gap 22 (H) 8 - 16 mmol/L    POC Ionized Calcium 1.08 (L) 1.13 - 1.33 mmol/L   Hemoglobin and hematocrit, blood    Collection Time: 02/02/18  4:05 PM   Result Value Ref Range    POC Hemoglobin 15.0 13.5 - 17.5 g/dL    POC Hematocrit 44 41 - 53 %   EKG 12 Lead    Collection Time: 02/02/18  4:05 PM   Result Value Ref Range    Ventricular Rate 92 BPM    Atrial Rate 92 BPM    P-R Interval 180 ms    QRS Duration 156 ms    Q-T Interval 400 ms    QTc Calculation (Bazett) 494 ms    P Axis 78 degrees    R Axis 90 degrees    T Axis 55 degrees   CBC Auto Differential    Collection Time: 02/02/18  4:09 PM   Result Value Ref Range    WBC 4.6 3.5 - 11.0 k/uL    RBC 4.51 4.5 - 5.9 m/uL    Hemoglobin 13.7 13.5 - 17.5 g/dL    Hematocrit 42.5 41 - 53 %    MCV 94.3 80 - 100 fL    MCH 30.4 26 - 34 pg    MCHC 32.3 31 - 37 g/dL    RDW 13.7 11.5 - 14.5 %    Platelets 359 372 - 364 k/uL    MPV 9.3 6.0 - 12.0 fL    NRBC Automated NOT REPORTED per 100 WBC    Differential Type NOT REPORTED     Immature Granulocytes NOT REPORTED 0 %    Absolute Immature Granulocyte NOT REPORTED 0.00 - 0.30 k/uL    WBC Morphology NOT REPORTED     RBC Morphology NOT REPORTED     Platelet Estimate NOT REPORTED     Seg Neutrophils 24 (L) 36 - 66 %    Lymphocytes 66 (H) 24 - 44 %    Monocytes 8 (H) 1 - 7 %    Eosinophils % 2 1 - 4 %    Basophils 0 %    Segs Absolute 1.10 (L) 1.8 - 7.7 k/uL    Absolute Lymph # 3.04 1.0 - 4.8 k/uL    Absolute Mono # 0.37 0.2 - 0.8 k/uL    Absolute Eos # 0.09 0.0 - 0.4 k/uL    Basophils # 0.00 0.0 - 0.2 k/uL   CK isoenzymes    Collection Time: 02/02/18  4:09 PM   Result Value Ref Range    Total  (H) 39 - 308 U/L POC Glucose 217 (H) 74 - 106 mg/dL    POC Hematocrit 40 (L) 41 - 53 %    POC pH 7.28 (L) 7.35 - 7.45    POC pCO2 46 (H) 32 - 45 mm Hg    POC PO2 47 (LL) 75 - 95 mm Hg    TCO2 (calc), Art 23 23 - 28 mmol/L    POC HCO3 21.5 (L) 22 - 27 mmol/L    Positive Base Excess, Art NOT REPORTED 0.0 - 2.0    Negative Base Excess, Art 5 (H) 0.0 - 2.0    POC O2 SAT 77 %    POC Hemoglobin 13.6 13.5 - 17.5 g/dL    O2 Device/Flow/% NOT REPORTED     FIO2 100.0     Pt Temp NOT REPORTED     POC pH Temp NOT REPORTED     POC pCO2 Temp NOT REPORTED mm Hg    POC pO2 Temp NOT REPORTED mm Hg   Troponin    Collection Time: 02/02/18  6:35 PM   Result Value Ref Range    Troponin T 2.97 (HH) <0.03 ng/mL    Troponin Interp         Comprehensive Metabolic Panel w/ Reflex to MG    Collection Time: 02/02/18  6:35 PM   Result Value Ref Range    Glucose 233 (H) 70 - 99 mg/dL    BUN 12 6 - 20 mg/dL    CREATININE 1.14 0.70 - 1.20 mg/dL    Bun/Cre Ratio 11 9 - 20    Calcium 7.2 (L) 8.6 - 10.4 mg/dL    Sodium 141 135 - 144 mmol/L    Potassium 3.4 (L) 3.7 - 5.3 mmol/L    Chloride 105 98 - 107 mmol/L    CO2 20 20 - 31 mmol/L    Anion Gap 16 9 - 17 mmol/L    Alkaline Phosphatase 68 40 - 129 U/L    ALT 54 (H) 5 - 41 U/L     (H) <40 U/L    Total Bilirubin 0.46 0.3 - 1.2 mg/dL    Total Protein 5.6 (L) 6.4 - 8.3 g/dL    Alb 3.4 (L) 3.5 - 5.2 g/dL    Albumin/Globulin Ratio NOT REPORTED 1.0 - 2.5    GFR Non-African American >60 >60 mL/min    GFR African American >60 >60 mL/min    GFR Comment          GFR Staging NOT REPORTED    Lactic acid, plasma    Collection Time: 02/02/18  6:35 PM   Result Value Ref Range    Lactic Acid 2.3 (H) 0.5 - 2.2 mmol/L    Lactic Acid, Whole Blood NOT REPORTED 0.7 - 2.1 mmol/L   Magnesium    Collection Time: 02/02/18  6:35 PM   Result Value Ref Range    Magnesium 1.7 1.6 - 2.6 mg/dL   UA w/Reflex Culture    Collection Time: 02/02/18  6:52 PM   Result Value Ref Range    Color, UA YELLOW YEL    Turbidity UA SLIGHTLY CLOUDY (A) CLEAR Art 3 (H) 0.0 - 2.0    Positive Base Excess, Art NOT REPORTED 0.0 - 2.0    POC O2 SAT 99 %    Pt Temp 97.7     O2 Device/Flow/% NOT REPORTED     FIO2 100.0     POC pH Temp NOT REPORTED     POC pCO2 Temp NOT REPORTED mm Hg    POC pO2 Temp NOT REPORTED mm Hg   K (Potassium)    Collection Time: 02/03/18 12:22 AM   Result Value Ref Range    Potassium 5.9 (H) 3.7 - 5.3 mmol/L   Magnesium    Collection Time: 02/03/18 12:22 AM   Result Value Ref Range    Magnesium 1.8 1.6 - 2.6 mg/dL   Troponin    Collection Time: 02/03/18 12:22 AM   Result Value Ref Range    Troponin T 17.22 (HH) <0.03 ng/mL    Troponin Interp         K (Potassium)    Collection Time: 02/03/18  1:25 AM   Result Value Ref Range    Potassium 5.6 (H) 3.7 - 5.3 mmol/L   POC PANEL (G3)-ART    Collection Time: 02/03/18  2:19 AM   Result Value Ref Range    POC pH 7.36 7.35 - 7.45    POC pCO2 39 32 - 45 mm Hg    POC PO2 208 (H) 75 - 95 mm Hg    TCO2 (calc), Art 23 23 - 28 mmol/L    POC HCO3 21.5 (L) 22 - 27 mmol/L    Negative Base Excess, Art 4 (H) 0.0 - 2.0    Positive Base Excess, Art NOT REPORTED 0.0 - 2.0    POC O2  %    Pt Temp 97.3     O2 Device/Flow/% NOT REPORTED     FIO2 90.0     POC pH Temp NOT REPORTED     POC pCO2 Temp NOT REPORTED mm Hg    POC pO2 Temp NOT REPORTED mm Hg   Drug Scr, Abuse, Ur    Collection Time: 02/03/18  3:34 AM   Result Value Ref Range    Amphetamine Screen, Ur NEGATIVE NEG    Barbiturate Screen, Ur NEGATIVE NEG    Benzodiazepine Screen, Urine POSITIVE (A) NEG    Cocaine Metabolite, Urine NEGATIVE NEG    Methadone Screen, Urine NEGATIVE NEG    Opiates, Urine POSITIVE (A) NEG    Phencyclidine, Urine NEGATIVE NEG    Propoxyphene, Urine NOT REPORTED NEG    Cannabinoid Scrn, Ur NEGATIVE NEG    Oxycodone Screen, Ur NEGATIVE NEG    Methamphetamine, Urine NOT REPORTED NEG    Tricyclic Antidepressants, Ur NOT REPORTED NEG    MDMA URINE NOT REPORTED NEG    Buprenorphine Urine NOT REPORTED NEG    Test Information       Assay provides medical screening only.   The absence of expected drug(s) and/or   Basic Metabolic Prof    Collection Time: 02/03/18  4:50 AM   Result Value Ref Range    Glucose 100 (H) 70 - 99 mg/dL    BUN 14 6 - 20 mg/dL    CREATININE 1.24 (H) 0.70 - 1.20 mg/dL    Bun/Cre Ratio 11 9 - 20    Calcium 8.6 8.6 - 10.4 mg/dL    Sodium 140 135 - 144 mmol/L    Potassium 4.7 3.7 - 5.3 mmol/L    Chloride 109 (H) 98 - 107 mmol/L    CO2 20 20 - 31 mmol/L    Anion Gap 11 9 - 17 mmol/L    GFR Non-African American >60 >60 mL/min    GFR African American >60 >60 mL/min    GFR Comment          GFR Staging NOT REPORTED    Lactic Acid    Collection Time: 02/03/18  4:50 AM   Result Value Ref Range    Lactic Acid 2.3 (H) 0.5 - 2.2 mmol/L   Liver Profile    Collection Time: 02/03/18  4:50 AM   Result Value Ref Range    Alb 3.2 (L) 3.5 - 5.2 g/dL    Alkaline Phosphatase 65 40 - 129 U/L     (H) 5 - 41 U/L     (H) <40 U/L    Total Bilirubin 0.26 (L) 0.3 - 1.2 mg/dL    Bilirubin, Direct 0.09 <0.31 mg/dL    Bilirubin, Indirect 0.17 0.00 - 1.00 mg/dL    Total Protein 5.8 (L) 6.4 - 8.3 g/dL    Globulin NOT REPORTED 1.5 - 3.8 g/dL    Albumin/Globulin Ratio NOT REPORTED 1.0 - 2.5   Magnesium    Collection Time: 02/03/18  4:50 AM   Result Value Ref Range    Magnesium 2.0 1.6 - 2.6 mg/dL   CBC with DIFF    Collection Time: 02/03/18  4:50 AM   Result Value Ref Range    WBC 11.0 3.5 - 11.0 k/uL    RBC 4.62 4.5 - 5.9 m/uL    Hemoglobin 14.0 13.5 - 17.5 g/dL    Hematocrit 43.3 41 - 53 %    MCV 93.6 80 - 100 fL    MCH 30.4 26 - 34 pg    MCHC 32.5 31 - 37 g/dL    RDW 13.7 11.5 - 14.5 %    Platelets 675 376 - 308 k/uL    MPV 9.2 6.0 - 12.0 fL    NRBC Automated NOT REPORTED per 100 WBC    Differential Type NOT REPORTED     Immature Granulocytes NOT REPORTED 0 %    Absolute Immature Granulocyte NOT REPORTED 0.00 - 0.30 k/uL    WBC Morphology NOT REPORTED     RBC Morphology NOT REPORTED     Platelet Estimate NOT REPORTED     Seg Neutrophils 92 (H) 36 - 66 %

## 2018-02-03 NOTE — PLAN OF CARE
[facility]    Second Visit Note  For more detailed information please refer to the progress note of the day      2/3/2018    6:44 PM    Name:   Kain Hernandez  MRN:     [id number]     Acct:      [de-identified]   Room:   2030/2030-01  IP Day:  1  Admit Date:  2/2/2018  4:00 PM    PCP:   No primary care provider on file. Code Status:  Full Code        Pt vitals were reviewed   New labs were reviewed   Patient was seen    Updated plan :     1. Daughter Andrés iKm and Mother by bedside. Discussed patient's current status with LIZY Bunch. 2. Currently being weaned from Levo. BP improved. BP goal less as patient was athletic. 3. If patient does well overnight, will consider extubating in AM, as per critical care attending.    4. Urine output 75 cc/hr        Jaylon Garner MD  2/3/2018  6:44 PM

## 2018-02-03 NOTE — FLOWSHEET NOTE
02/02/18 1900   Provider Notification   Reason for Communication Review case;Evaluate;New orders   Provider Name Dr. Anthony Listen   Provider Notification Physician   Method of Communication Face to face   Response At bedside   Notification Time 1900     Physician at bedside to evaluate the pt. Orders received and placed in the computer. See orders.

## 2018-02-03 NOTE — FLOWSHEET NOTE
02/03/18 1004   Provider Notification   Reason for Communication Evaluate; Review case   Provider Name Dr. Nickie Evans   Provider Notification Physician   Method of Communication Face to face   Response At bedside   Notification Time 1004     Dr. Nickie Evans and NP at bedside. NO new orders at this time.

## 2018-02-03 NOTE — PROGRESS NOTES
Left main coronary artery is essentially normal. There is a haziness  consistent with thrombus in the distal left main  LAD: The LAD has a flush occlusion with no evidence of collateral flow from  the right or left.    Lesion on Prox LAD: Ostial.100% stenosis 10 mm length reduced to 0%. Pre    procedure ORIANA 0 flow was noted. Post Procedure ORIANA III flow was    present. Good runoff was present. The lesion was diagnosed as High Risk    (C). The lesion showed evidence of thrombus presence.    Comments: There was a large thrombus initially present in the proximal LAD    causing a flush occlusion. This extended into a large high first    diagonal. Following stent placement and catheter embolectomy there was    excellent flow into the LAD.    Lesion on 1st Diag: Ostial.100% stenosis. Pre procedure ORIANA 0 flow was    noted. Post Procedure ORIANA II flow was present. Good runoff was present.    The lesion was diagnosed as High Risk (C).   Comments: The ostium of this diagonal was jailed by the stent in the    ostium and proximal LAD. There was still a large clot visible but no    definite stenosis. Flow in the diagonal was ORIANA grade 2  LCx: The circumflex itself is widely patent. There is evidence of nonflow  limiting thrombus at the ostium. Distal flow was ORIANA grade 3  RCA: The right coronary artery is dominant and normal.  LV Analysis  Ejection Fraction  +----------------------------------------------------------------------+---+  ! Method                                                                !EF%! +----------------------------------------------------------------------+---+  ! LV gram                                                               !25 ! +----------------------------------------------------------------------+---+    ASSESSMENT/PLAN    1. CAD: S/P STEMI with FREDDIE to LAD. Continue ASA, Lipitor, and Brilinta  2. Cardiogenic Shock: Intubated, vented, sedated. Pulmonary/critical care managing. 3. Ventricular Tachycardia: On IV Amio. No recurrence  4. 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 Echo pending for today  5. Elevated Liver Enzymes: Trend for now. May need to switch amio if no improvement. Wean pressors as able. Wean from vent  Depending on progress, will initiate BB and ACEI/ARB at later time  May need to be switched from Amio if no improvement in liver enzymes        Patient case will be discussed with either Dr. Michel David or Dr. Elia Connors and follow-up will be done accordingly.       Liam Stover CNP

## 2018-02-03 NOTE — PROGRESS NOTES
Pulmonary Critical Care Progress Note    Patient seen for the follow up of STEMI (ST elevation myocardial infarction) ([address]     Subjective:    He is still intubated and sedated. He requires Levophed at 12 mics per minute. He has good urine output after Bumex. He follow commands off sedation. He had elevated potassium this morning and I ordered insulin and glucose IV. CVP is 6 today. FiO2 is 50%. Echo done at bedside showed ejection fraction 15-20%. V. tach resolved by monitor    Examination:    Vitals: BP 96/69   Pulse 66   Temp 97.5 °F (36.4 °C) (Temporal)   Resp 22   Ht 6' 5\" (1.956 m)   Wt 245 lb (111.1 kg)   SpO2 100%   BMI 29.05 kg/m²   SpO2  Av.9 %  Min: 83 %  Max: 100 %  General appearance: Intubated and sedated  Neck: No JVD  Lungs: Slightly Decreased breath sounds crackles or wheezing  Heart: regular rate and rhythm, S1, S2 normal, no gallop  Abdomen: Soft, non tender, + BS  Extremities: no cyanosis or clubbing. No significant edema    LABs:    CBC:   Recent Labs      18   1609  18   0450   WBC  4.6  11.0   HGB  13.7  14.0   HCT  42.5  43.3   PLT  212  211     BMP:   Recent Labs      18   1835   18   0125  18   0450   NA  141   --    --   140   K  3.4*   < >  5.6*  4.7   CO2  20   --    --   20   BUN  12   --    --   14   CREATININE  1.14   --    --   1.24*   LABGLOM  >60   --    --   >60   GLUCOSE  233*   --    --   100*    < > = values in this interval not displayed. PT/INR:   Recent Labs      18   160   PROTIME  11.0   INR  1.1     APTT:  Recent Labs      18   160   APTT  21.5*     LIVER PROFILE:  Recent Labs      18   1835  18   0450   AST  216*  815*   ALT  54*  146*   LABALBU  3.4*  3.2*       Radiology:  682/3/2018  1.  Improved pulmonary edema.       2.  Unchanged positioning of support tubes and lines.        Impression:  · Acute  Respiratory insufficiency   · STEMI s/p stent to LAD  · Pulmonary edema  · Cardiogenic shock/ ventricular tachycardia  · Exsmoker possible COPD With exacerbation  · Hyperkalemia  · Acute renal insufficiency  · Lactic acidosis  · Increased LFT likely related to hypotension    Recommendations:  · Assist-control ventilation/ titrate FiO2 down  · IV sedation switch propofol to rass -2  · DuoNeb by nebulizer every 6 hours  · Make Solu-Medrol 40 IV every 8 hour  · Zosyn IV for possible aspiration  · kayexalate 15 g x1  · Add vasopressin 0.04/min  · Wean off the Levophed  To MAP 60  · Hold coreg till BP improves  · Amiodarone drip  ·  discussed with patient's family at bedside  ·  startt TF per dietician  · Repeat LFT  · Discussed with RN  · DVT and peptic ulcer disease prophylaxis    Refugio Harding MD, [facility]  Pulmonary Critical Care and Sleep Medicine,  [facility]  Cell: [phone]  Office: [phone]                                                                                                  Cc 35 min

## 2018-02-03 NOTE — CONSULTS
Pulmonary Medicine and Critical Care Consult    Patient Nitin Montes   MRN -  [id number]   Randall # - [de-identified]   - 1960      Date of Admission -  2018  4:00 PM  Date of evaluation -  2018  Room - -01   [facility] [address], [place] Primary Care Physician - No primary care provider on file. Reason for Consult      ICU management / Acute MI/ Shock      Assessment   · Acute  Respiratory insufficiency   · STEMI s/p stent to LAD  · Pulmonary edema  · Cardiogenic shock/ ventricular tachycardia  · Exsmoker possible COPD With exacerbation  · Hypocalcemia/ hypokalemia  · Acute renal insufficiency  · Lactic acidosis  · Increased LFT likely related to hypotension      Recommendations   · Assist-control ventilation/increase PEEP to 14 titrate FiO2 down  · ABGs  · IV sedation switch propofol to Versed  · DuoNeb by nebulizer every 6 hours  · Solu-Medrol 40 IV every 6 hour  · Zosyn IV for possible aspiration  · Calcium chloride IV ×1  · Potassium chloride replacement  · bumex 1 mg  IV ×1  · Wean off the Levophed and dopamine  · Hold coreg till BP improves  · Called and discussed with patient's sister; she agreed on central line placement  · Repeat LFT / lactate  · Discussed with RN  · DVT and peptic ulcer disease prophylaxis    Problem List      Patient Active Problem List   Diagnosis    STEMI (ST elevation myocardial infarction) ([facility] [address]       JARED Roque is 62 y.o.,  male, presented to the emergency department By life squad for evaluation of chest pain while he was exercising on the treadmill at the [facility] . He had associated lightheadedness and was  was diaphoretic at the scene and his initial EKG showed inferior wall and anterolateral wall ST elevation. His pain was 8 /10. Patient was started on IV fluids and transported to the emergency department. He was found to have some runs of V. tach and was started on amiodarone drip.    He was takenTo the cardiac cath lab and underwent angioplasty and stent placement to the LAD. He developed hemodynamic compromise and was intubated and started on dopamine and Levophed during the procedure. He received bicarb IV push for acidosis. He had an arterial line placed. He still requires Levophed at 12/min and dopamine at 5:/minute. He had significant desaturation was 100% oxygen. I was called by RN and notified of his ABGs and updated on his situation so I came in. I came in to evaluate. I increased his PEEP to 14. He had some cough and leland bloody fluid per ET tube and evaluation.            PMHx   Past Medical History      Diagnosis Date    Dementia       Past Surgical History    No past surgical history on file. Meds    Current Medications    sodium chloride flush  10 mL Intravenous 2 times per day    carvedilol  3.125 mg Oral BID     [START ON 2/3/2018] losartan  25 mg Oral Daily    ticagrelor  90 mg Oral BID    atorvastatin  40 mg Oral Nightly    sodium chloride flush  10 mL Intravenous 2 times per day    aspirin  81 mg Oral Daily    sodium chloride  500 mL Intravenous Once    bumetanide  1 mg Intravenous Once    calcium chloride        midazolam        calcium chloride        calcium chloride  1 g Intravenous Once     sodium chloride flush, acetaminophen, magnesium hydroxide, ondansetron, sodium chloride flush, acetaminophen, magnesium hydroxide, ondansetron, fentanNYL  IV Drips/Infusions   sodium chloride 125 mL/hr at 02/02/18 1825    heparin (porcine)      abciximab (REOPRO) IV infusion 0.125 mcg/kg/min (02/02/18 1729)    norepinephrine 16 mcg/min (02/02/18 1936)    DOPamine 6 mcg/kg/min (02/02/18 1834)    propofol 40 mcg/kg/min (02/02/18 1905)    midazolam      cisatracurium (NIMBEX) infusion       Home Medications  No prescriptions prior to admission. Allergies    Review of patient's allergies indicates not on file.   Social History     Social History     Social History    Marital status: LYMPHSABS 3.04 02/02/2018    EOSABS 0.09 02/02/2018    BASOSABS 0.00 02/02/2018    DIFFTYPE NOT REPORTED 02/02/2018     BMP Lab Results   Component Value Date     02/02/2018    K 3.4 02/02/2018     02/02/2018    CO2 20 02/02/2018    BUN 12 02/02/2018    CREATININE 1.14 02/02/2018    GLUCOSE 233 02/02/2018    CALCIUM 7.2 02/02/2018     LFTS  Lab Results   Component Value Date    ALKPHOS 68 02/02/2018    ALT 54 02/02/2018     02/02/2018    PROT 5.6 02/02/2018    BILITOT 0.46 02/02/2018    LABALBU 3.4 02/02/2018       Lab Results   Component Value Date    APTT 21.5 (L) 02/02/2018     INR   Lab Results   Component Value Date    INR 1.1 02/02/2018    PROTIME 11.0 02/02/2018       Radiology    CXR  Moderately severe pulmonary edema and new ET and NG tubes                  \"Thank you for asking us to see this patient\"    Case discussed with nurse and patient/family. Questions and concerns addressed.     Electronically signed by     Gosia Snow MD on 2/2/2018 at 7:46 PM                                                                                                                                                                                 Cc 75 min excluding procedure

## 2018-02-03 NOTE — FLOWSHEET NOTE
02/03/18 0200 02/03/18 0240 02/03/18 0245   Provider Notification   Reason for Communication Review case Review case Review case;New orders   Provider Name Dr. Erin Bello   Provider Notification Physician Physician Physician   Method of Communication Page Page Call   Response Waiting for response Waiting for response See orders   Notification Time [phone]     RN notified the physician regarding the pt's elevated potassium. Updated the physician on the pt's current potassium as well as history of cocaine use up until 6 months ago. Physician stated to give 15 g of kaxelate, 1 amp of dextrose, 10 units of regular insulin and order a urine tox at this time. Orders placed. See orders. Will continue to monitor.

## 2018-02-03 NOTE — PROCEDURES
Julio Roque is a 62 y.o. male patient. 1. Acute ST elevation myocardial infarction (STEMI) of anterolateral wall (HCC)      Past Medical History:   Diagnosis Date    Dementia      Blood pressure 97/70, pulse 68, temperature 97.3 °F (36.3 °C), temperature source Temporal, resp. rate 22, height 6' 5\" (1.956 m), weight 245 lb (111.1 kg), SpO2 97 %. Indication ; cardiogenic shock      Central Line  Date/Time: 2/2/2018 8:11 PM  Performed by: Harry Arellano  Authorized by: Harry Arellano   Consent: Verbal consent obtained. Risks and benefits: risks, benefits and alternatives were discussed  Consent given by: sister. Patient identity confirmed: arm band and provided demographic data  Time out: Immediately prior to procedure a \"time out\" was called to verify the correct patient, procedure, equipment, support staff and site/side marked as required.   Indications: central pressure monitoring and vascular access    Anesthesia:  Local Anesthetic: lidocaine 1% without epinephrine  Preparation: skin prepped with ChloraPrep  Skin prep agent dried: skin prep agent completely dried prior to procedure  Sterile barriers: all five maximum sterile barriers used - cap, mask, sterile gown, sterile gloves, and large sterile sheet  Hand hygiene: hand hygiene performed prior to central venous catheter insertion  Location details: left internal jugular  Patient position: Trendelenburg  Catheter type: triple lumen  Pre-procedure: landmarks identified  Ultrasound guidance: yes  Sterile ultrasound techniques: sterile gel and sterile probe covers were used  Number of attempts: 1  Successful placement: yes  Post-procedure: line sutured and dressing applied  Assessment: blood return through all ports  Patient tolerance: Patient tolerated the procedure well with no immediate complications          Edu Johnson MD  2/2/2018

## 2018-02-04 ENCOUNTER — APPOINTMENT (OUTPATIENT)
Dept: CT IMAGING | Age: 58
DRG: 246 | End: 2018-02-04

## 2018-02-04 ENCOUNTER — APPOINTMENT (OUTPATIENT)
Dept: GENERAL RADIOLOGY | Age: 58
DRG: 246 | End: 2018-02-04

## 2018-02-04 LAB
-: NORMAL
ALBUMIN SERPL-MCNC: 3 G/DL (ref 3.5–5.2)
ALBUMIN/GLOBULIN RATIO: ABNORMAL (ref 1–2.5)
ALP BLD-CCNC: 55 U/L (ref 40–129)
ALT SERPL-CCNC: 120 U/L (ref 5–41)
AMORPHOUS: NORMAL
ANION GAP SERPL CALCULATED.3IONS-SCNC: 13 MMOL/L (ref 9–17)
AST SERPL-CCNC: 408 U/L
BACTERIA: NORMAL
BILIRUB SERPL-MCNC: 0.26 MG/DL (ref 0.3–1.2)
BILIRUBIN DIRECT: 0.09 MG/DL
BILIRUBIN URINE: NEGATIVE
BILIRUBIN, INDIRECT: 0.17 MG/DL (ref 0–1)
BUN BLDV-MCNC: 18 MG/DL (ref 6–20)
BUN/CREAT BLD: 17 (ref 9–20)
CALCIUM SERPL-MCNC: 8.4 MG/DL (ref 8.6–10.4)
CASTS UA: NORMAL /LPF
CHLORIDE BLD-SCNC: 105 MMOL/L (ref 98–107)
CO2: 21 MMOL/L (ref 20–31)
COLOR: YELLOW
COMMENT UA: ABNORMAL
CREAT SERPL-MCNC: 1.06 MG/DL (ref 0.7–1.2)
CRYSTALS, UA: NORMAL /HPF
EPITHELIAL CELLS UA: NORMAL /HPF (ref 0–5)
FIO2: 30
FIO2: 40
GFR AFRICAN AMERICAN: >60 ML/MIN
GFR NON-AFRICAN AMERICAN: >60 ML/MIN
GFR SERPL CREATININE-BSD FRML MDRD: ABNORMAL ML/MIN/{1.73_M2}
GFR SERPL CREATININE-BSD FRML MDRD: ABNORMAL ML/MIN/{1.73_M2}
GLOBULIN: ABNORMAL G/DL (ref 1.5–3.8)
GLUCOSE BLD-MCNC: 154 MG/DL (ref 70–99)
GLUCOSE URINE: NEGATIVE
KETONES, URINE: NEGATIVE
LEUKOCYTE ESTERASE, URINE: NEGATIVE
MUCUS: NORMAL
NEGATIVE BASE EXCESS, ART: ABNORMAL (ref 0–2)
NEGATIVE BASE EXCESS, ART: ABNORMAL (ref 0–2)
NITRITE, URINE: NEGATIVE
O2 DEVICE/FLOW/%: ABNORMAL
O2 DEVICE/FLOW/%: ABNORMAL
OTHER OBSERVATIONS UA: NORMAL
PATIENT TEMP: 97
PATIENT TEMP: ABNORMAL
PH UA: 6 (ref 5–8)
POC HCO3: 22.7 MMOL/L (ref 22–27)
POC HCO3: 24.1 MMOL/L (ref 22–27)
POC O2 SATURATION: 100 %
POC O2 SATURATION: 98 %
POC PCO2 TEMP: ABNORMAL MM HG
POC PCO2 TEMP: ABNORMAL MM HG
POC PCO2: 28 MM HG (ref 32–45)
POC PCO2: 34 MM HG (ref 32–45)
POC PH TEMP: ABNORMAL
POC PH TEMP: ABNORMAL
POC PH: 7.46 (ref 7.35–7.45)
POC PH: 7.52 (ref 7.35–7.45)
POC PO2 TEMP: ABNORMAL MM HG
POC PO2 TEMP: ABNORMAL MM HG
POC PO2: 153 MM HG (ref 75–95)
POC PO2: 93 MM HG (ref 75–95)
POSITIVE BASE EXCESS, ART: 0 (ref 0–2)
POSITIVE BASE EXCESS, ART: 0 (ref 0–2)
POTASSIUM SERPL-SCNC: 4.1 MMOL/L (ref 3.7–5.3)
PROTEIN UA: NEGATIVE
RBC UA: NORMAL /HPF (ref 0–2)
RENAL EPITHELIAL, UA: NORMAL /HPF
SODIUM BLD-SCNC: 139 MMOL/L (ref 135–144)
SPECIFIC GRAVITY UA: 1.01 (ref 1–1.03)
TCO2 (CALC), ART: 24 MMOL/L (ref 23–28)
TCO2 (CALC), ART: 25 MMOL/L (ref 23–28)
TOTAL PROTEIN: 5.6 G/DL (ref 6.4–8.3)
TRICHOMONAS: NORMAL
TURBIDITY: ABNORMAL
URINE HGB: ABNORMAL
UROBILINOGEN, URINE: NORMAL
WBC UA: NORMAL /HPF (ref 0–5)
YEAST: NORMAL

## 2018-02-04 PROCEDURE — 70450 CT HEAD/BRAIN W/O DYE: CPT

## 2018-02-04 PROCEDURE — 94770 HC ETCO2 MONITOR DAILY: CPT

## 2018-02-04 PROCEDURE — 6360000002 HC RX W HCPCS: Performed by: INTERNAL MEDICINE

## 2018-02-04 PROCEDURE — 6370000000 HC RX 637 (ALT 250 FOR IP): Performed by: NURSE PRACTITIONER

## 2018-02-04 PROCEDURE — 6370000000 HC RX 637 (ALT 250 FOR IP): Performed by: INTERNAL MEDICINE

## 2018-02-04 PROCEDURE — 82803 BLOOD GASES ANY COMBINATION: CPT

## 2018-02-04 PROCEDURE — 94003 VENT MGMT INPAT SUBQ DAY: CPT

## 2018-02-04 PROCEDURE — 94761 N-INVAS EAR/PLS OXIMETRY MLT: CPT

## 2018-02-04 PROCEDURE — 99232 SBSQ HOSP IP/OBS MODERATE 35: CPT | Performed by: INTERNAL MEDICINE

## 2018-02-04 PROCEDURE — 2000000000 HC ICU R&B

## 2018-02-04 PROCEDURE — 2580000003 HC RX 258: Performed by: INTERNAL MEDICINE

## 2018-02-04 PROCEDURE — 37799 UNLISTED PX VASCULAR SURGERY: CPT

## 2018-02-04 PROCEDURE — 80076 HEPATIC FUNCTION PANEL: CPT

## 2018-02-04 PROCEDURE — 81001 URINALYSIS AUTO W/SCOPE: CPT

## 2018-02-04 PROCEDURE — 2500000003 HC RX 250 WO HCPCS: Performed by: INTERNAL MEDICINE

## 2018-02-04 PROCEDURE — 80048 BASIC METABOLIC PNL TOTAL CA: CPT

## 2018-02-04 PROCEDURE — 94640 AIRWAY INHALATION TREATMENT: CPT

## 2018-02-04 PROCEDURE — 71045 X-RAY EXAM CHEST 1 VIEW: CPT

## 2018-02-04 RX ORDER — METHYLPREDNISOLONE SODIUM SUCCINATE 40 MG/ML
40 INJECTION, POWDER, LYOPHILIZED, FOR SOLUTION INTRAMUSCULAR; INTRAVENOUS EVERY 12 HOURS
Status: DISCONTINUED | OUTPATIENT
Start: 2018-02-05 | End: 2018-02-05

## 2018-02-04 RX ORDER — AMIODARONE HYDROCHLORIDE 200 MG/1
200 TABLET ORAL DAILY
Status: DISCONTINUED | OUTPATIENT
Start: 2018-02-04 | End: 2018-02-09 | Stop reason: HOSPADM

## 2018-02-04 RX ADMIN — PROPOFOL 45 MCG/KG/MIN: 10 INJECTION, EMULSION INTRAVENOUS at 07:15

## 2018-02-04 RX ADMIN — IPRATROPIUM BROMIDE AND ALBUTEROL SULFATE 1 AMPULE: .5; 3 SOLUTION RESPIRATORY (INHALATION) at 14:34

## 2018-02-04 RX ADMIN — PROPOFOL 45 MCG/KG/MIN: 10 INJECTION, EMULSION INTRAVENOUS at 00:57

## 2018-02-04 RX ADMIN — TICAGRELOR 90 MG: 90 TABLET ORAL at 09:20

## 2018-02-04 RX ADMIN — PROPOFOL 45 MCG/KG/MIN: 10 INJECTION, EMULSION INTRAVENOUS at 04:07

## 2018-02-04 RX ADMIN — ATORVASTATIN CALCIUM 40 MG: 40 TABLET, FILM COATED ORAL at 21:45

## 2018-02-04 RX ADMIN — TAZOBACTAM SODIUM AND PIPERACILLIN SODIUM 3.38 G: 375; 3 INJECTION, SOLUTION INTRAVENOUS at 05:01

## 2018-02-04 RX ADMIN — METHYLPREDNISOLONE SODIUM SUCCINATE 40 MG: 40 INJECTION, POWDER, FOR SOLUTION INTRAMUSCULAR; INTRAVENOUS at 06:04

## 2018-02-04 RX ADMIN — TICAGRELOR 90 MG: 90 TABLET ORAL at 21:45

## 2018-02-04 RX ADMIN — TAZOBACTAM SODIUM AND PIPERACILLIN SODIUM 3.38 G: 375; 3 INJECTION, SOLUTION INTRAVENOUS at 13:18

## 2018-02-04 RX ADMIN — IPRATROPIUM BROMIDE AND ALBUTEROL SULFATE 1 AMPULE: .5; 3 SOLUTION RESPIRATORY (INHALATION) at 19:15

## 2018-02-04 RX ADMIN — Medication 10 ML: at 21:45

## 2018-02-04 RX ADMIN — IPRATROPIUM BROMIDE AND ALBUTEROL SULFATE 1 AMPULE: .5; 3 SOLUTION RESPIRATORY (INHALATION) at 06:10

## 2018-02-04 RX ADMIN — AMIODARONE HYDROCHLORIDE 200 MG: 200 TABLET ORAL at 09:34

## 2018-02-04 RX ADMIN — TAZOBACTAM SODIUM AND PIPERACILLIN SODIUM 3.38 G: 375; 3 INJECTION, SOLUTION INTRAVENOUS at 21:45

## 2018-02-04 RX ADMIN — ASPIRIN 81 MG: 81 TABLET, COATED ORAL at 09:20

## 2018-02-04 RX ADMIN — IPRATROPIUM BROMIDE AND ALBUTEROL SULFATE 1 AMPULE: .5; 3 SOLUTION RESPIRATORY (INHALATION) at 11:07

## 2018-02-04 RX ADMIN — METHYLPREDNISOLONE SODIUM SUCCINATE 40 MG: 40 INJECTION, POWDER, FOR SOLUTION INTRAMUSCULAR; INTRAVENOUS at 13:18

## 2018-02-04 ASSESSMENT — PULMONARY FUNCTION TESTS
PIF_VALUE: 18
PIF_VALUE: 19
PIF_VALUE: 1
PIF_VALUE: 14
PIF_VALUE: 18

## 2018-02-04 ASSESSMENT — PAIN SCALES - GENERAL
PAINLEVEL_OUTOF10: 1
PAINLEVEL_OUTOF10: 0
PAINLEVEL_OUTOF10: 1
PAINLEVEL_OUTOF10: 0

## 2018-02-04 NOTE — PROGRESS NOTES
Dr. Devang Velez asked for repeat urine sample due to cloudiness noted in uremeter. MD updated that it is the tubing and bag that has cloudy color along outside from before. Uremeter bag changed out and urine sample sent. Negative for UTI at this time. Results for Dieter Rahman (MRN [id number]) as of 2/4/2018 14:41   Ref.  Range 2/4/2018 13:10   Color, UA Latest Ref Range: YEL  YELLOW   Turbidity UA Latest Ref Range: CLEAR  SLIGHTLY CLOUDY (A)   Glucose, UA Latest Ref Range: NEG  NEGATIVE   Bilirubin, Urine Latest Ref Range: NEG  NEGATIVE   Ketones, Urine Latest Ref Range: NEG  NEGATIVE   Specific Gravity, UA Latest Ref Range: 1.005 - 1.030  1.010   pH, UA Latest Ref Range: 5.0 - 8.0  6.0   Protein, UA Latest Ref Range: NEG  NEGATIVE   Urobilinogen, Urine Latest Ref Range: NORM  Normal   Nitrite, Urine Latest Ref Range: NEG  NEGATIVE   Leukocyte Esterase, Urine Latest Ref Range: NEG  NEGATIVE   Urinalysis Comments Unknown NOT REPORTED   Casts UA Latest Units: /LPF NOT REPORTED   Mucus, UA Latest Ref Range: NONE  NOT REPORTED   WBC, UA Latest Ref Range: 0 - 5 /HPF 0 TO 2   RBC, UA Latest Ref Range: 0 - 2 /HPF 2 TO 5   Epi Cells Latest Ref Range: 0 - 5 /HPF 2 TO 5   Renal Epithelial, Urine Latest Ref Range: 0 /HPF NOT REPORTED   Bacteria, UA Latest Ref Range: NONE  NOT REPORTED   Amorphous, UA Latest Ref Range: NONE  NOT REPORTED   Yeast, Urine Latest Ref Range: NONE  NOT REPORTED   Crystals Latest Ref Range: NONE /HPF NOT REPORTED   Urine Hgb Latest Ref Range: NEG  TRACE (A)   Trichomonas, UA Latest Ref Range: NONE  NOT REPORTED   Other Observations UA Latest Ref Range: NREQ  NOT REPORTED

## 2018-02-04 NOTE — PROGRESS NOTES
chloride flush  10 mL Intravenous 2 times per day    losartan  25 mg Oral Daily    atorvastatin  40 mg Oral Nightly    sodium chloride flush  10 mL Intravenous 2 times per day    aspirin  81 mg Oral Daily    sodium chloride  500 mL Intravenous Once    ticagrelor  90 mg Oral BID    ipratropium-albuterol  1 ampule Inhalation 4x daily    piperacillin-tazobactam  3.375 g Intravenous Q8H     Continuous Infusions:    vasopressin infusion 0.01 Units/min (18)    sodium chloride 15 mL/hr at 18    norepinephrine 4 mcg/min (18)    DOPamine 3 mcg/kg/min (18)    propofol 45 mcg/kg/min (18)    midazolam Stopped (18)    cisatracurium (NIMBEX) infusion      Amiodarone 0.5 mg/min (18)     PRN Meds: dextrose, sodium chloride flush, acetaminophen, magnesium hydroxide, ondansetron, sodium chloride flush, acetaminophen, magnesium hydroxide, ondansetron, fentanNYL    Data:     Past Medical History:   has a past medical history of Dementia. Social History:        Family History: No family history on file. Vitals:  BP 97/69   Pulse 71   Temp 97.8 °F (36.6 °C) (Temporal)   Resp 16   Ht 6' 5\" (1.956 m)   Wt 296 lb 3.2 oz (134.4 kg)   SpO2 100%   BMI 35.12 kg/m²   Temp (24hrs), Av.4 °F (36.3 °C), Min:97 °F (36.1 °C), Max:98.2 °F (36.8 °C)    Recent Labs      18   1605  18   1800   POCGLU  139*  217*       I/O (24Hr):     Intake/Output Summary (Last 24 hours) at 18 0815  Last data filed at 18 0559   Gross per 24 hour   Intake          2253.68 ml   Output             1300 ml   Net           953.68 ml       Labs:    Hematology:  Recent Labs      18   1609  18   0450   WBC  4.6  11.0   RBC  4.51  4.62   HGB  13.7  14.0   HCT  42.5  43.3   MCV  94.3  93.6   MCH  30.4  30.4   MCHC  32.3  32.5   RDW  13.7  13.7   PLT  212  211   MPV  9.3  9.2   INR  1.1   --      Chemistry:  Recent Labs      18   5530 jugular central venous line in place terminating in the SVC. Otherwise, stable chest demonstrating pulmonary edema.  Endotracheal tube and NG tube remain in place.     EXAMINATION: SINGLE VIEW OF THE CHEST   2/3/2018 8:04 am  Impression 1. No significant change and findings of pulmonary edema.   2. Unchanged positioning of support tubes and lines. Physical Examination:        General appearance:  alert, cooperative and no distress  Mental Status:  Garbled speech, sentences not connected, difficult to understand  Lungs:  clear to auscultation bilaterally, normal effort  Heart:  regular rate and rhythm, no murmur  Abdomen:  soft, nontender, nondistended, normal bowel sounds, no masses, hepatomegaly, splenomegaly  Extremities:  no edema, redness, tenderness in the calves  Skin:  no gross lesions, rashes, induration    Assessment:        Primary Problem  STEMI (ST elevation myocardial infarction) [facility])    Active Hospital Problems    Diagnosis Date Noted    Ventricular tachycardia ([facility] [address] [I47.2] 02/03/2018    Former smoker [Z87.891] 02/03/2018    Lactic acidosis [E87.2] 02/03/2018    Cardiogenic shock ([address] [R57.0] 02/03/2018    Shock liver [K72.00] 02/03/2018    STEMI (ST elevation myocardial infarction) ([facility] [address] [I21.3] 02/02/2018       Plan:        STEMI s/p FREDDIE to LAD. Post Cath was in Cardiogenic Shock. Weaned off of levophed and dopamine. Off of pressors now. Getting HbA1c and lipid panel for risk factor stratification. Cont ASA, Brilinta, Lipitor, Losartan.      Acute hypoxic Respiratory failure, is now s/p extubation on 2/4/18. CXR initially showed pulmonary edema. Was diuresed. Interval CXR shows improvement. Urine output 125 mL/hr      Pulmonary edema, resolved. Garbled, nonsensical speech/Altered mentation post extubation. Ordered CT Head.      VTach. Off of amio gtt. Now on oral amio.      Abnormal LFTs, likely shock liver, now improved.  Monitor with repeat labs in AM.      Lactic Acidosis, likely

## 2018-02-04 NOTE — PROGRESS NOTES
Pulmonary Critical Care Progress Note    Patient seen for the follow up of STEMI (ST elevation myocardial infarction) ([address]     Subjective:    He passed his CPAP trial today. I extubated him to oxygen and he is on 2 L nasal cannula. He is off pressors. He had passed his bedside swallow. He is confused and accuses got of trying to kill him. He is sitting in a chair    Examination:    Vitals: /65   Pulse 83   Temp 98.6 °F (37 °C) (Temporal)   Resp (!) 33   Ht 6' 5\" (1.956 m)   Wt 296 lb 3.2 oz (134.4 kg)   SpO2 99%   BMI 35.12 kg/m²   SpO2  Av.8 %  Min: 98 %  Max: 100 %  General appearance: Awake alert but confused  Neck: No JVD  Lungs: Slightly Decreased breath sounds crackles or wheezing  Heart: regular rate and rhythm, S1, S2 normal, no gallop  Abdomen: Soft, non tender, + BS  Extremities: no cyanosis or clubbing. No significant edema    LABs:    CBC:   Recent Labs      18   1609  18   0450   WBC  4.6  11.0   HGB  13.7  14.0   HCT  42.5  43.3   PLT  212  211     BMP:   Recent Labs      18   0450  18   0550   NA  140  139   K  4.7  4.1   CO2  20  21   BUN  14  18   CREATININE  1.24*  1.06   LABGLOM  >60  >60   GLUCOSE  100*  154*     PT/INR:   Recent Labs      18   1609   PROTIME  11.0   INR  1.1     APTT:  Recent Labs      18   1609   APTT  21.5*     LIVER PROFILE:  Recent Labs      18   0450  18   0550   AST  815*  408*   ALT  146*  120*   LABALBU  3.2*  3.0*       Radiology:  682/3/2018  1. Unchanged pulmonary edema.           Impression:  · Acute  Respiratory insufficiency   · STEMI s/p stent to LAD  · Pulmonary edema  · S/p Cardiogenic shock/ ventricular tachycardia  · Exsmoker possible COPD With exacerbation  · Hyperkalemia  · Acute renal insufficiency  · Lactic acidosis  · Increased LFT likely related to hypotension    Recommendations:  · Oxygen by nasal cannula  · Home oxygen evaluation before discharge  · incentive spirometer every hour

## 2018-02-04 NOTE — PROGRESS NOTES
[facility]  [address], [address] [facility], [address]  Tel:  [phone]      Fax:  [phone]  www. [employer]    Cardiology Note:    Patient seen at this time. Lying in bed. Appears comfortable. Remains intubated/vented and sedated. Bear hugger continues. Per nursing: Preliminary EF from echo shows 15-20%. Relatively stable. Levophed off- Dopamine being weaned still. IV amio at 0.5 mg currently. Bear hugger placed last night secondary to hypothermia. BP 97/69   Pulse 71   Temp 97.8 °F (36.6 °C) (Temporal)   Resp 16   Ht 6' 5\" (1.956 m)   Wt 296 lb 3.2 oz (134.4 kg)   SpO2 100%   BMI 35.12 kg/m²     Intubated/vented. Sedated  Neck supple. Atraumatic. Lung sounds clear throughout. Regular rate. Vented. S1S2. Regular rate and rhythm. No murmur noted. No edema. PPP. Bowel sounds active. Abdomen soft, non-tender, non-distended. ECG reviewed- Appears sinus. CBC:   Recent Labs      02/02/18   1609  02/03/18   0450   WBC  4.6  11.0   HGB  13.7  14.0   HCT  42.5  43.3   MCV  94.3  93.6   PLT  212  211      BMP:   Recent Labs      02/02/18   1835   02/03/18   0125  02/03/18   0450  02/04/18   0550   NA  141   --    --   140  139   K  3.4*   < >  5.6*  4.7  4.1   CL  105   --    --   109*  105   CO2  20   --    --   20  21   BUN  12   --    --   14  18   CREATININE  1.14   --    --   1.24*  1.06    < > = values in this interval not displayed. Magnesium:   Recent Labs      02/02/18   1835  02/03/18   0022  02/03/18   0450   MG  1.7  1.8  2.0       ASSESSMENT/PLAN     1. CAD: S/P STEMI with FREDDIE to LAD. Continue ASA, Lipitor, and Brilinta  2. Cardiogenic Shock: Intubated, vented, sedated. Pulmonary/critical care managing. Improving. 3. Ventricular Tachycardia: 5 beat run yesterday noted. D/C IV Amio. Start 200 mg PO/NG daily. 4. Ischemic CMP: Echo EF was preliminary reported at 15-20%. (No formal echo report noted at this time).   5. Elevated Liver

## 2018-02-04 NOTE — PROGRESS NOTES
Patient attempting to get up out of his chair on his own. Patient knows he is in the hospital, he knows his name and the people in the room and states the president is \"President Ignorant\" and laughs. Patient able to answer questions correctly but has periods of bizarre statements. Patient repeats himself often and does tell family to \"shut up at times\". Dr. Jamil Hinson did speak with family regarding some mood swings that are common after extubation and heart events. Family updated and aware.

## 2018-02-05 ENCOUNTER — APPOINTMENT (OUTPATIENT)
Dept: GENERAL RADIOLOGY | Age: 58
DRG: 246 | End: 2018-02-05

## 2018-02-05 LAB
ALBUMIN SERPL-MCNC: 3 G/DL (ref 3.5–5.2)
ALBUMIN/GLOBULIN RATIO: ABNORMAL (ref 1–2.5)
ALP BLD-CCNC: 53 U/L (ref 40–129)
ALT SERPL-CCNC: 82 U/L (ref 5–41)
ANION GAP SERPL CALCULATED.3IONS-SCNC: 10 MMOL/L (ref 9–17)
AST SERPL-CCNC: 177 U/L
BILIRUB SERPL-MCNC: 0.65 MG/DL (ref 0.3–1.2)
BILIRUBIN DIRECT: 0.14 MG/DL
BILIRUBIN, INDIRECT: 0.51 MG/DL (ref 0–1)
BUN BLDV-MCNC: 17 MG/DL (ref 6–20)
BUN/CREAT BLD: 14 (ref 9–20)
CALCIUM SERPL-MCNC: 8.2 MG/DL (ref 8.6–10.4)
CHLORIDE BLD-SCNC: 107 MMOL/L (ref 98–107)
CHOLESTEROL/HDL RATIO: 2.4
CHOLESTEROL: 98 MG/DL
CO2: 25 MMOL/L (ref 20–31)
CREAT SERPL-MCNC: 1.19 MG/DL (ref 0.7–1.2)
EKG ATRIAL RATE: 100 BPM
EKG ATRIAL RATE: 71 BPM
EKG P AXIS: 66 DEGREES
EKG P-R INTERVAL: 190 MS
EKG Q-T INTERVAL: 388 MS
EKG Q-T INTERVAL: 398 MS
EKG QRS DURATION: 138 MS
EKG QRS DURATION: 88 MS
EKG QTC CALCULATION (BAZETT): 421 MS
EKG QTC CALCULATION (BAZETT): 600 MS
EKG R AXIS: 51 DEGREES
EKG R AXIS: 90 DEGREES
EKG T AXIS: -46 DEGREES
EKG T AXIS: 82 DEGREES
EKG VENTRICULAR RATE: 137 BPM
EKG VENTRICULAR RATE: 71 BPM
ESTIMATED AVERAGE GLUCOSE: 108 MG/DL
GFR AFRICAN AMERICAN: >60 ML/MIN
GFR NON-AFRICAN AMERICAN: >60 ML/MIN
GFR SERPL CREATININE-BSD FRML MDRD: ABNORMAL ML/MIN/{1.73_M2}
GFR SERPL CREATININE-BSD FRML MDRD: ABNORMAL ML/MIN/{1.73_M2}
GLOBULIN: ABNORMAL G/DL (ref 1.5–3.8)
GLUCOSE BLD-MCNC: 117 MG/DL (ref 70–99)
HBA1C MFR BLD: 5.4 % (ref 4–6)
HDLC SERPL-MCNC: 41 MG/DL
LDL CHOLESTEROL: 43 MG/DL (ref 0–130)
POTASSIUM SERPL-SCNC: 3.8 MMOL/L (ref 3.7–5.3)
SODIUM BLD-SCNC: 142 MMOL/L (ref 135–144)
TOTAL PROTEIN: 5.5 G/DL (ref 6.4–8.3)
TRIGL SERPL-MCNC: 70 MG/DL
VLDLC SERPL CALC-MCNC: NORMAL MG/DL (ref 1–30)

## 2018-02-05 PROCEDURE — 6370000000 HC RX 637 (ALT 250 FOR IP): Performed by: INTERNAL MEDICINE

## 2018-02-05 PROCEDURE — C1887 CATHETER, GUIDING: HCPCS

## 2018-02-05 PROCEDURE — 97530 THERAPEUTIC ACTIVITIES: CPT

## 2018-02-05 PROCEDURE — 80061 LIPID PANEL: CPT

## 2018-02-05 PROCEDURE — 71046 X-RAY EXAM CHEST 2 VIEWS: CPT

## 2018-02-05 PROCEDURE — G8978 MOBILITY CURRENT STATUS: HCPCS

## 2018-02-05 PROCEDURE — C1894 INTRO/SHEATH, NON-LASER: HCPCS

## 2018-02-05 PROCEDURE — 80076 HEPATIC FUNCTION PANEL: CPT

## 2018-02-05 PROCEDURE — C1725 CATH, TRANSLUMIN NON-LASER: HCPCS

## 2018-02-05 PROCEDURE — 6360000002 HC RX W HCPCS: Performed by: INTERNAL MEDICINE

## 2018-02-05 PROCEDURE — C1760 CLOSURE DEV, VASC: HCPCS

## 2018-02-05 PROCEDURE — 83036 HEMOGLOBIN GLYCOSYLATED A1C: CPT

## 2018-02-05 PROCEDURE — G8979 MOBILITY GOAL STATUS: HCPCS

## 2018-02-05 PROCEDURE — 2500000003 HC RX 250 WO HCPCS: Performed by: INTERNAL MEDICINE

## 2018-02-05 PROCEDURE — C1769 GUIDE WIRE: HCPCS

## 2018-02-05 PROCEDURE — C1874 STENT, COATED/COV W/DEL SYS: HCPCS

## 2018-02-05 PROCEDURE — 97162 PT EVAL MOD COMPLEX 30 MIN: CPT

## 2018-02-05 PROCEDURE — 94640 AIRWAY INHALATION TREATMENT: CPT

## 2018-02-05 PROCEDURE — 97535 SELF CARE MNGMENT TRAINING: CPT

## 2018-02-05 PROCEDURE — G8988 SELF CARE GOAL STATUS: HCPCS

## 2018-02-05 PROCEDURE — 97165 OT EVAL LOW COMPLEX 30 MIN: CPT

## 2018-02-05 PROCEDURE — 2580000003 HC RX 258: Performed by: INTERNAL MEDICINE

## 2018-02-05 PROCEDURE — 2060000000 HC ICU INTERMEDIATE R&B

## 2018-02-05 PROCEDURE — 99232 SBSQ HOSP IP/OBS MODERATE 35: CPT | Performed by: INTERNAL MEDICINE

## 2018-02-05 PROCEDURE — 97116 GAIT TRAINING THERAPY: CPT

## 2018-02-05 PROCEDURE — 2500000003 HC RX 250 WO HCPCS

## 2018-02-05 PROCEDURE — G8987 SELF CARE CURRENT STATUS: HCPCS

## 2018-02-05 PROCEDURE — 80048 BASIC METABOLIC PNL TOTAL CA: CPT

## 2018-02-05 PROCEDURE — C1757 CATH, THROMBECTOMY/EMBOLECT: HCPCS

## 2018-02-05 PROCEDURE — 6370000000 HC RX 637 (ALT 250 FOR IP): Performed by: NURSE PRACTITIONER

## 2018-02-05 RX ORDER — PREDNISONE 20 MG/1
20 TABLET ORAL DAILY
Status: DISCONTINUED | OUTPATIENT
Start: 2018-02-05 | End: 2018-02-09 | Stop reason: HOSPADM

## 2018-02-05 RX ORDER — CARVEDILOL 3.12 MG/1
3.12 TABLET ORAL 2 TIMES DAILY WITH MEALS
Status: DISCONTINUED | OUTPATIENT
Start: 2018-02-05 | End: 2018-02-07

## 2018-02-05 RX ADMIN — ASPIRIN 81 MG: 81 TABLET, COATED ORAL at 08:34

## 2018-02-05 RX ADMIN — METHYLPREDNISOLONE SODIUM SUCCINATE 40 MG: 40 INJECTION, POWDER, FOR SOLUTION INTRAMUSCULAR; INTRAVENOUS at 13:41

## 2018-02-05 RX ADMIN — PREDNISONE 20 MG: 20 TABLET ORAL at 15:07

## 2018-02-05 RX ADMIN — TAZOBACTAM SODIUM AND PIPERACILLIN SODIUM 3.38 G: 375; 3 INJECTION, SOLUTION INTRAVENOUS at 04:58

## 2018-02-05 RX ADMIN — ATORVASTATIN CALCIUM 40 MG: 40 TABLET, FILM COATED ORAL at 20:35

## 2018-02-05 RX ADMIN — IPRATROPIUM BROMIDE AND ALBUTEROL SULFATE 1 AMPULE: .5; 3 SOLUTION RESPIRATORY (INHALATION) at 11:58

## 2018-02-05 RX ADMIN — IPRATROPIUM BROMIDE AND ALBUTEROL SULFATE 1 AMPULE: .5; 3 SOLUTION RESPIRATORY (INHALATION) at 21:24

## 2018-02-05 RX ADMIN — LOSARTAN POTASSIUM 25 MG: 25 TABLET ORAL at 08:34

## 2018-02-05 RX ADMIN — IPRATROPIUM BROMIDE AND ALBUTEROL SULFATE 1 AMPULE: .5; 3 SOLUTION RESPIRATORY (INHALATION) at 08:20

## 2018-02-05 RX ADMIN — TAZOBACTAM SODIUM AND PIPERACILLIN SODIUM 3.38 G: 375; 3 INJECTION, SOLUTION INTRAVENOUS at 13:41

## 2018-02-05 RX ADMIN — Medication 10 ML: at 08:35

## 2018-02-05 RX ADMIN — TICAGRELOR 90 MG: 90 TABLET ORAL at 20:35

## 2018-02-05 RX ADMIN — METHYLPREDNISOLONE SODIUM SUCCINATE 40 MG: 40 INJECTION, POWDER, FOR SOLUTION INTRAMUSCULAR; INTRAVENOUS at 01:35

## 2018-02-05 RX ADMIN — AMIODARONE HYDROCHLORIDE 200 MG: 200 TABLET ORAL at 08:34

## 2018-02-05 RX ADMIN — TICAGRELOR 90 MG: 90 TABLET ORAL at 08:34

## 2018-02-05 RX ADMIN — CARVEDILOL 3.12 MG: 3.12 TABLET, FILM COATED ORAL at 17:06

## 2018-02-05 RX ADMIN — Medication 10 ML: at 20:35

## 2018-02-05 ASSESSMENT — PAIN SCALES - GENERAL
PAINLEVEL_OUTOF10: 0

## 2018-02-05 NOTE — PROGRESS NOTES
Physical Therapy    Facility/Department: [facility] CVICU  Initial Assessment    NAME: Sue Wagoner  : 1960  MRN: [id number]  Discharge Recommendation:   Home with assist/ Cardiac Rehab. Date of Service: 2018  Sue Wagoner is a 62 y.o. male who presents to the emergency department By life squad for evaluation of chest pain. His initial EKG was consistent with STEMI. Patient was doing cardio in the gym when he developed lightheadedness and chest pain. He was diaphoretic at the scene and his initial EKG showed inferior wall and anterolateral wall ST elevation. Patient was started on IV fluids and transported to the emergency department uneventfully. 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 He is not on any medication at home. He rates his pain as 8 on a pain scale of 10.       Patient Diagnosis(es): The primary encounter diagnosis was Acute ST elevation myocardial infarction (STEMI) of anterolateral wall ([address] A diagnosis of Cardiogenic shock (HCC) was also pertinent to this visit. has a past medical history of Dementia. has no past surgical history on file.     Restrictions  Restrictions/Precautions  Restrictions/Precautions: General Precautions, Fall Risk  Vision/Hearing        Subjective  General  Chart Reviewed: Yes  Family / Caregiver Present: No  Follows Commands: Within Functional Limits  General Comment  Comments: Per RN, pt. has 20% EF  Pain Screening  Patient Currently in Pain: Denies          Orientation  Orientation  Overall Orientation Status: Within Functional Limits    Social/Functional History  Social/Functional History  Lives With: Family (brother and mother)  Type of Home: House  Home Layout: Two level  Home Access: Stairs to enter with rails  Entrance Stairs - Number of Steps: 3  Bathroom Shower/Tub: Shower chair with back, Tub/Shower unit  Bathroom Toilet: Standard  ADL Assistance: Independent  Homemaking Assistance: Independent  Ambulation Assistance: Independent  Transfer Assistance: Independent  Active : Yes  Mode of Transportation: Car  Occupation: Retired  Type of occupation: athlete  Objective          AROM RLE (degrees)  RLE AROM: WNL  AROM LLE (degrees)  LLE AROM : WNL  AROM RUE (degrees)  RUE AROM : WFL  RUE General AROM: 150 degrees flexion  AROM LUE (degrees)  LUE AROM : WFL  LUE General AROM: 90 degrees flexion  Strength RLE  Strength RLE: WNL  Strength LLE  Strength LLE: WNL  Strength RUE  Strength RUE: WNL  Strength LUE  Strength LUE: WNL  Tone RLE  RLE Tone: Normotonic  Tone LLE  LLE Tone: Normotonic        Transfers  Sit to Stand: Contact guard assistance  Stand to sit: Contact guard assistance  Bed to Chair: Contact guard assistance  Ambulation  Ambulation?: Yes  Ambulation 1  Surface: level tile  Device: No Device  Assistance: Contact guard assistance  Quality of Gait: Rigid trunk with no rotation; fwd. head;  states he feels very stable on his feet. Felt CGA was necessary for ambulation. SOB noted. Normal pace-declined standing rest break. Discussed st. cane with pt. and he was not interested in trying. Distance: 200 ft. Comments: Back to bed at pt's request;  Edu. for pacing himself and taking approp. rest breaks     Balance  Posture: Fair  Sitting - Static: Good  Sitting - Dynamic: Good  Standing - Static: Good  Standing - Dynamic: Good;-        Assessment   Body structures, Functions, Activity limitations: Decreased functional mobility ; Decreased ROM; Decreased strength;Decreased endurance;Decreased balance;Decreased safe awareness  Assessment: Pt. 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 Will need continued edu. for spacing activities and pacing himself. He currently has an EF of 20% and SOB with activity. States he has already been talking with  about cardiac rehab up in [place], [place] where he is from.     Specific instructions for Next Treatment: endurance

## 2018-02-05 NOTE — PROGRESS NOTES
methylPREDNISolone  40 mg Intravenous Q12H    sodium polystyrene  15 g Oral Once    sodium chloride flush  10 mL Intravenous 2 times per day    losartan  25 mg Oral Daily    atorvastatin  40 mg Oral Nightly    sodium chloride flush  10 mL Intravenous 2 times per day    aspirin  81 mg Oral Daily    sodium chloride  500 mL Intravenous Once    ticagrelor  90 mg Oral BID    ipratropium-albuterol  1 ampule Inhalation 4x daily    piperacillin-tazobactam  3.375 g Intravenous Q8H     Continuous Infusions:    vasopressin infusion Stopped (18)    sodium chloride Stopped (180)    norepinephrine Stopped (18)    DOPamine Stopped (18 1349)    propofol Stopped (18)    midazolam Stopped (18)    cisatracurium (NIMBEX) infusion       PRN Meds: dextrose, sodium chloride flush, acetaminophen, magnesium hydroxide, ondansetron, sodium chloride flush, acetaminophen, magnesium hydroxide, ondansetron, fentanNYL    Data:     Past Medical History:   has a past medical history of Dementia. Social History:        Family History: No family history on file. Vitals:  /75   Pulse 94   Temp 98.2 °F (36.8 °C) (Temporal)   Resp 18   Ht 6' 5\" (1.956 m)   Wt 298 lb 6.4 oz (135.4 kg)   SpO2 94%   BMI 35.39 kg/m²   Temp (24hrs), Av.3 °F (36.8 °C), Min:98.1 °F (36.7 °C), Max:98.6 °F (37 °C)    Recent Labs      18   1605  18   1800   POCGLU  139*  217*       I/O (24Hr):     Intake/Output Summary (Last 24 hours) at 18 0837  Last data filed at 18 0835   Gross per 24 hour   Intake            779.1 ml   Output             2370 ml   Net          -1590.9 ml       Labs:    Hematology:  Recent Labs      18   1609  18   0450   WBC  4.6  11.0   RBC  4.51  4.62   HGB  13.7  14.0   HCT  42.5  43.3   MCV  94.3  93.6   MCH  30.4  30.4   MCHC  32.3  32.5   RDW  13.7  13.7   PLT  212  211   MPV  9.3  9.2   INR  1.1   -- Chemistry:  Recent Labs      02/02/18   1609  02/02/18   1835  02/03/18   0022   02/03/18   0450  02/04/18   0550  02/05/18 0457   NA  141  141   --    --   140  139  142   K  3.3*  3.4*  5.9*   < >  4.7  4.1  3.8   CL  101  105   --    --   109*  105  107   CO2  23  20   --    --   20  21  25   GLUCOSE  140*  233*   --    --   100*  154*  117*   BUN  12  12   --    --   14  18  17   CREATININE  1.47*  1.14   --    --   1.24*  1.06  1.19   MG   --   1.7  1.8   --   2.0   --    --    ANIONGAP  17  16   --    --   11  13  10   LABGLOM  49*  >60   --    --   >60  >60  >60   GFRAA  60*  >60   --    --   >60  >60  >60   CALCIUM  8.7  7.2*   --    --   8.6  8.4*  8.2*   PROBNP  78   --    --    --    --    --    --    TROPONINI  0.00   --    --    --    --    --    --    TROPONINT  <0.03  2.97*  17.22*   --    --    --    --    CKTOTAL  387*   --    --    --    --    --    --    CKMB  4.5   --    --    --    --    --    --    LACTACIDWB   --   NOT REPORTED   --    --    --    --    --     < > = values in this interval not displayed. Recent Labs      02/02/18   1605  02/02/18   1800   02/03/18   0450  02/04/18   0550  02/05/18 0457   PROT   --    --    < >  5.8*  5.6*  5.5*   LABALBU   --    --    < >  3.2*  3.0*  3.0*   AST   --    --    < >  815*  408*  177*   ALT   --    --    < >  146*  120*  82*   ALKPHOS   --    --    < >  65  55  53   BILITOT   --    --    < >  0.26*  0.26*  0.65   BILIDIR   --    --    --   0.09  0.09  0.14   CHOL   --    --    --    --    --   98   HDL   --    --    --    --    --   41   LDLCHOLESTEROL   --    --    --    --    --   43   CHOLHDLRATIO   --    --    --    --    --   2.4   TRIG   --    --    --    --    --   70   VLDL   --    --    --    --    --   NOT REPORTED   POCGLU  139*  217*   --    --    --    --     < > = values in this interval not displayed.          No results found for: SPECIAL  No results found for: CULTURE    Lab Results   Component Value Date    POCPH 7.46

## 2018-02-05 NOTE — PROGRESS NOTES
Within Normal Limits  Observation/Palpation  Posture: Good  Observation: forward positioned head, a little rounding of upper back, neck appeared stiff  Balance  Sitting Balance: Supervision  Standing Balance: Contact guard assistance  Standing Balance  Sit to stand: Contact guard assistance  Stand to sit: Contact guard assistance  Functional Mobility  Functional - Mobility Device: No device  Activity: Other (Pt. ambulated from bed to hallway, all the way around nurses' station, and back to bed)  Assist Level: Contact guard assistance  Functional Mobility Comments: Pt. needed vc for pacing  ADL  Feeding: Independent  Grooming: Stand by assistance;Contact guard assistance (SBA for seated, CGA for standing)  UE Bathing: Stand by assistance (for seated)  LE Bathing: Contact guard assistance  UE Dressing: Stand by assistance (for seated)  LE Dressing: Contact guard assistance  Toileting: Contact guard assistance  Tone RUE  RUE Tone: Normotonic  Tone LUE  LUE Tone: Normotonic  Coordination  Movements Are Fluid And Coordinated: Yes     Bed mobility  Rolling to Right: Supervision  Supine to Sit: Supervision  Sit to Supine: Supervision  Scooting: Supervision  Transfers  Sit to stand: Contact guard assistance  Stand to sit: Contact guard assistance     Cognition  Overall Cognitive Status: Exceptions  Memory: Decreased short term memory;Decreased long term memory  Safety Judgement: Decreased awareness of need for assistance;Decreased awareness of need for safety  Problem Solving: Assistance required to generate solutions;Assistance required to implement solutions;Assistance required to correct errors made;Decreased awareness of errors  Insights: Decreased awareness of deficits  Cognition Comment: Pt. demonstrated some impulsivity and lack of safety awareness  Perception  Overall Perceptual Status: WFL     Sensation  Overall Sensation Status: Impaired (tingling in hands)        LUE AROM (degrees)  LUE AROM : Exceptions  LUE

## 2018-02-05 NOTE — PLAN OF CARE
Problem: Nutrition  Goal: Optimal nutrition therapy  Nutrition Problem: Inadequate oral intake  Intervention: Food and/or Nutrient Delivery: Continue current diet, ONS not indicated  Nutritional Goals: PO intake to meet >75% of kcal/protein needs, with good tolerance of diet consistency, glycemic control (on steroids)  Outcome: Ongoing

## 2018-02-05 NOTE — CARE COORDINATION
Case Management Initial Discharge [facility],         Readmission Risk              Readmission Risk:        0       Age 72 or Greater:  0    Admitted from SNF or Requires Paid or Family Care:  0    Currently has CHF,COPD,ARF,CRI,or is on dialysis:  0    Takes more than 5 Prescription Medications:  0    Takes Digoxin,Insulin,Anticoagulants,Narcotics or ASA/Plavix:  [facility] in Past 12 Months:  0    On Disability:  0    Patient Considers own Health:  0            Met with:patient to discuss discharge plans. Information verified: address, contacts, phone number, , insurance Yes  PCP: No primary care provider on file. Date of last visit:     Insurance Provider: Self pay    Discharge Planning  Current Residence:   Private residence  Living Arrangements:  Mom, sister and [address] 49 has 2 stories/10 stairs down to living area  Support Systems:  Children, Family Members, Friends/Neighbors  Current Services PTA:    Supplier: none  Patient able to perform ADL's:Independent  DME used to aid ambulation prior to admission: none/during admission - none    Potential Assistance Needed:  N/A    Pharmacy: [facility] in [place], [place]   Potential Assistance Purchasing Medications:  No  Does patient want to participate in local refill/ meds to beds program?  No    Patient agreeable to home care: No  [place] of choice provided:  n/a      Type of Home Care Services:  None  Patient expects to be discharged to:  home    Prior SNF/Rehab Placement and Facility: No  Agreeable to SNF/Rehab: No  [place] of choice provided: n/a   Evaluation: n/a    Expected Discharge date:  18  Follow Up Appointment: Best Day/ Time:      Transportation provider: mom  Transportation arrangements needed for discharge: No    Discharge Plan: Met with patient at bedside. Lives with mom, sister and LORRAINE. Lives in [place], [place] and was here shopping and working out on 18.  Was on stair master and states had massive

## 2018-02-05 NOTE — PLAN OF CARE
[facility]    Second Visit Note  For more detailed information please refer to the progress note of the day      2/4/2018    7:00 PM    Name:   Jayro Bowers  MRN:     [id number]     Acct:      [de-identified]   Room:   2030/2030-01  IP Day:  2  Admit Date:  2/2/2018  4:00 PM    PCP:   No primary care provider on file. Code Status:  Full Code        Pt vitals were reviewed   New labs were reviewed   Patient was seen    Updated plan :     1. Patient in bedside chair. Appears winded while he speaks  2. Speaks about CTE in detail. Part of study at [facility].   3. Good urine output        Amaad Painter MD  2/4/2018  7:00 PM

## 2018-02-05 NOTE — PLAN OF CARE
Problem: Restraint Use - Nonviolent/Non-Self-Destructive Behavior:  Goal: Absence of restraint indications  Absence of restraint indications   Outcome: Completed Date Met: 02/04/18  Patient no longer requires restraints at this time. Able to follow directions and verbalizes understanding of medical equipment and need. Goal: Absence of restraint-related injury  Absence of restraint-related injury   Outcome: Completed Date Met: 02/04/18      Problem: Falls - Risk of  Goal: Absence of falls  Outcome: Ongoing  Patient free from fall/injury at this time. Call light in reach. Problem: Nutrition  Goal: Optimal nutrition therapy  Nutrition Problem: Inadequate oral intake  Intervention: Food and/or Nutrient Delivery: Continue NPO, Start Tube Feeding  Nutritional Goals: EN intake to meet >75% kcal/protein needs, with good glycemic control, management of renal indices   Outcome: Ongoing  Patient tolerating oral intake, denies nausea. Comments: Patient free from fall/injury at this time. Call light in reach. Patient verbalizes understanding of medications, treatments and plan of care.  Electronically signed by Pinky Combs RN on 2/4/2018 at 9:14 PM

## 2018-02-05 NOTE — FLOWSHEET NOTE
02/05/18 1100   Provider Notification   Reason for Communication Review case   Provider Name Alta RAQUEL Mackay   Provider Notification Nurse Practitioner   Method of Communication Face to face   Response At bedside   Notification Time 1100   NP rounded at this time. Advised patient does not have insurance and this may cause issues with taking Brilinta. Requested signature for zoll lifevest.   Requested evaluation for stepdown. Per NP:   Give Coreg first between coreg & cozaar, if needed after coreg administration, then administer cozaar. Office will follow up with samples of Brilinta as necessary. Signed zoll lifevest paperwork. OK for stepdown status from NP's standpoint.

## 2018-02-06 ENCOUNTER — APPOINTMENT (OUTPATIENT)
Dept: GENERAL RADIOLOGY | Age: 58
DRG: 246 | End: 2018-02-06

## 2018-02-06 LAB
ABO/RH: NORMAL
ALBUMIN SERPL-MCNC: 3.3 G/DL (ref 3.5–5.2)
ALBUMIN/GLOBULIN RATIO: ABNORMAL (ref 1–2.5)
ALP BLD-CCNC: 57 U/L (ref 40–129)
ALT SERPL-CCNC: 63 U/L (ref 5–41)
ANION GAP SERPL CALCULATED.3IONS-SCNC: 11 MMOL/L (ref 9–17)
ANTIBODY SCREEN: NEGATIVE
ARM BAND NUMBER: NORMAL
AST SERPL-CCNC: 89 U/L
BILIRUB SERPL-MCNC: 0.46 MG/DL (ref 0.3–1.2)
BILIRUBIN DIRECT: 0.14 MG/DL
BILIRUBIN, INDIRECT: 0.32 MG/DL (ref 0–1)
BLD PROD TYP BPU: NORMAL
BLD PROD TYP BPU: NORMAL
BUN BLDV-MCNC: 20 MG/DL (ref 6–20)
BUN/CREAT BLD: 19 (ref 9–20)
CALCIUM SERPL-MCNC: 8.3 MG/DL (ref 8.6–10.4)
CHLORIDE BLD-SCNC: 109 MMOL/L (ref 98–107)
CO2: 25 MMOL/L (ref 20–31)
CREAT SERPL-MCNC: 1.07 MG/DL (ref 0.7–1.2)
CROSSMATCH RESULT: NORMAL
CROSSMATCH RESULT: NORMAL
DISPENSE STATUS BLOOD BANK: NORMAL
DISPENSE STATUS BLOOD BANK: NORMAL
EXPIRATION DATE: NORMAL
GFR AFRICAN AMERICAN: >60 ML/MIN
GFR NON-AFRICAN AMERICAN: >60 ML/MIN
GFR SERPL CREATININE-BSD FRML MDRD: ABNORMAL ML/MIN/{1.73_M2}
GFR SERPL CREATININE-BSD FRML MDRD: ABNORMAL ML/MIN/{1.73_M2}
GLOBULIN: ABNORMAL G/DL (ref 1.5–3.8)
GLUCOSE BLD-MCNC: 108 MG/DL (ref 70–99)
HCT VFR BLD CALC: 41.9 % (ref 41–53)
HEMOGLOBIN: 13.8 G/DL (ref 13.5–17.5)
INR BLD: 1.1
MAGNESIUM: 2.2 MG/DL (ref 1.6–2.6)
MCH RBC QN AUTO: 31 PG (ref 26–34)
MCHC RBC AUTO-ENTMCNC: 32.9 G/DL (ref 31–37)
MCV RBC AUTO: 94.1 FL (ref 80–100)
MYOGLOBIN: 34 NG/ML (ref 28–72)
NRBC AUTOMATED: ABNORMAL PER 100 WBC
PARTIAL THROMBOPLASTIN TIME: 24.8 SEC (ref 23–31)
PDW BLD-RTO: 13 % (ref 11.5–14.5)
PLATELET # BLD: 172 K/UL (ref 130–400)
PMV BLD AUTO: ABNORMAL FL (ref 6–12)
POTASSIUM SERPL-SCNC: 3.5 MMOL/L (ref 3.7–5.3)
POTASSIUM SERPL-SCNC: 3.9 MMOL/L (ref 3.7–5.3)
PROTHROMBIN TIME: 11.4 SEC (ref 9.7–11.6)
RBC # BLD: 4.45 M/UL (ref 4.5–5.9)
SODIUM BLD-SCNC: 145 MMOL/L (ref 135–144)
TOTAL PROTEIN: 5.7 G/DL (ref 6.4–8.3)
TRANSFUSION STATUS: NORMAL
TRANSFUSION STATUS: NORMAL
TROPONIN INTERP: ABNORMAL
TROPONIN T: 4.19 NG/ML
TROPONIN T: 5.59 NG/ML
TROPONIN T: 5.61 NG/ML
UNIT DIVISION: 0
UNIT DIVISION: 0
UNIT NUMBER: NORMAL
UNIT NUMBER: NORMAL
WBC # BLD: 10.2 K/UL (ref 3.5–11)

## 2018-02-06 PROCEDURE — 94640 AIRWAY INHALATION TREATMENT: CPT

## 2018-02-06 PROCEDURE — 93005 ELECTROCARDIOGRAM TRACING: CPT

## 2018-02-06 PROCEDURE — 85610 PROTHROMBIN TIME: CPT

## 2018-02-06 PROCEDURE — 80048 BASIC METABOLIC PNL TOTAL CA: CPT

## 2018-02-06 PROCEDURE — 85027 COMPLETE CBC AUTOMATED: CPT

## 2018-02-06 PROCEDURE — 71045 X-RAY EXAM CHEST 1 VIEW: CPT

## 2018-02-06 PROCEDURE — 94660 CPAP INITIATION&MGMT: CPT

## 2018-02-06 PROCEDURE — 6370000000 HC RX 637 (ALT 250 FOR IP): Performed by: INTERNAL MEDICINE

## 2018-02-06 PROCEDURE — 6370000000 HC RX 637 (ALT 250 FOR IP): Performed by: NURSE PRACTITIONER

## 2018-02-06 PROCEDURE — 80076 HEPATIC FUNCTION PANEL: CPT

## 2018-02-06 PROCEDURE — 2060000000 HC ICU INTERMEDIATE R&B

## 2018-02-06 PROCEDURE — 83735 ASSAY OF MAGNESIUM: CPT

## 2018-02-06 PROCEDURE — 6360000002 HC RX W HCPCS

## 2018-02-06 PROCEDURE — 2580000003 HC RX 258: Performed by: INTERNAL MEDICINE

## 2018-02-06 PROCEDURE — 83874 ASSAY OF MYOGLOBIN: CPT

## 2018-02-06 PROCEDURE — 99232 SBSQ HOSP IP/OBS MODERATE 35: CPT | Performed by: INTERNAL MEDICINE

## 2018-02-06 PROCEDURE — 85730 THROMBOPLASTIN TIME PARTIAL: CPT

## 2018-02-06 PROCEDURE — 2500000003 HC RX 250 WO HCPCS: Performed by: INTERNAL MEDICINE

## 2018-02-06 PROCEDURE — 84484 ASSAY OF TROPONIN QUANT: CPT

## 2018-02-06 PROCEDURE — 84132 ASSAY OF SERUM POTASSIUM: CPT

## 2018-02-06 RX ORDER — FUROSEMIDE 10 MG/ML
INJECTION INTRAMUSCULAR; INTRAVENOUS
Status: COMPLETED
Start: 2018-02-06 | End: 2018-02-06

## 2018-02-06 RX ORDER — FUROSEMIDE 10 MG/ML
40 INJECTION INTRAMUSCULAR; INTRAVENOUS ONCE
Status: COMPLETED | OUTPATIENT
Start: 2018-02-06 | End: 2018-02-06

## 2018-02-06 RX ORDER — POTASSIUM CHLORIDE 20 MEQ/1
20 TABLET, EXTENDED RELEASE ORAL ONCE
Status: COMPLETED | OUTPATIENT
Start: 2018-02-06 | End: 2018-02-06

## 2018-02-06 RX ADMIN — TAZOBACTAM SODIUM AND PIPERACILLIN SODIUM 3.38 G: 375; 3 INJECTION, SOLUTION INTRAVENOUS at 15:12

## 2018-02-06 RX ADMIN — IPRATROPIUM BROMIDE AND ALBUTEROL SULFATE 1 AMPULE: .5; 3 SOLUTION RESPIRATORY (INHALATION) at 21:33

## 2018-02-06 RX ADMIN — ASPIRIN 81 MG: 81 TABLET, COATED ORAL at 08:36

## 2018-02-06 RX ADMIN — IPRATROPIUM BROMIDE AND ALBUTEROL SULFATE 1 AMPULE: .5; 3 SOLUTION RESPIRATORY (INHALATION) at 07:30

## 2018-02-06 RX ADMIN — CARVEDILOL 3.12 MG: 3.12 TABLET, FILM COATED ORAL at 08:36

## 2018-02-06 RX ADMIN — FUROSEMIDE 40 MG: 10 INJECTION INTRAMUSCULAR; INTRAVENOUS at 12:43

## 2018-02-06 RX ADMIN — TICAGRELOR 90 MG: 90 TABLET ORAL at 08:36

## 2018-02-06 RX ADMIN — FUROSEMIDE 40 MG: 10 INJECTION, SOLUTION INTRAVENOUS at 12:43

## 2018-02-06 RX ADMIN — CARVEDILOL 3.12 MG: 3.12 TABLET, FILM COATED ORAL at 17:19

## 2018-02-06 RX ADMIN — PREDNISONE 20 MG: 20 TABLET ORAL at 08:35

## 2018-02-06 RX ADMIN — ATORVASTATIN CALCIUM 40 MG: 40 TABLET, FILM COATED ORAL at 20:04

## 2018-02-06 RX ADMIN — AMIODARONE HYDROCHLORIDE 200 MG: 200 TABLET ORAL at 08:35

## 2018-02-06 RX ADMIN — POTASSIUM CHLORIDE 20 MEQ: 20 TABLET, EXTENDED RELEASE ORAL at 09:53

## 2018-02-06 RX ADMIN — TAZOBACTAM SODIUM AND PIPERACILLIN SODIUM 3.38 G: 375; 3 INJECTION, SOLUTION INTRAVENOUS at 22:44

## 2018-02-06 RX ADMIN — TICAGRELOR 90 MG: 90 TABLET ORAL at 20:04

## 2018-02-06 RX ADMIN — IPRATROPIUM BROMIDE AND ALBUTEROL SULFATE 1 AMPULE: .5; 3 SOLUTION RESPIRATORY (INHALATION) at 15:39

## 2018-02-06 RX ADMIN — IPRATROPIUM BROMIDE AND ALBUTEROL SULFATE 1 AMPULE: .5; 3 SOLUTION RESPIRATORY (INHALATION) at 11:12

## 2018-02-06 RX ADMIN — Medication 10 ML: at 12:44

## 2018-02-06 ASSESSMENT — PAIN SCALES - GENERAL
PAINLEVEL_OUTOF10: 0

## 2018-02-06 NOTE — PROGRESS NOTES
Physical Therapy  DATE: 2018    NAME: Minoo Gonzalez  MRN: [id number]   : 1960  RN cancelled PT today. Pt. With pulmonary edema and on Bipap. Check tomorrow.

## 2018-02-06 NOTE — FLOWSHEET NOTE
Patient just back to bed after walking with nursing student. He is receptive and friendly, and engages in conversation with writer. He shares that he \"had a heart attack. \" He expresses some shock at this, but seems to be coping normally. He tells writer about his reading of the Bible, as well as some of his beliefs and practices. Writer offers supportive conversation, listening presence, and prayer. Patient expresses thanks for visit and support, as well as for all the care he has received here. Spiritual Care will follow as needed. 02/06/18 0943   Encounter Summary   Services provided to: Patient   Referral/Consult From: Anton   Continue Visiting (2/6/18)   Complexity of Encounter Moderate   Length of Encounter 15 minutes   Routine   Type Follow up   Assessment Approachable   Intervention Active listening;Explored feelings, thoughts, concerns; Discussed belief system/[place] practices/[place];Discussed illness/injury and it's impact   Outcome Receptive;Engaged in conversation; Shared life review;Expressed gratitude

## 2018-02-07 ENCOUNTER — APPOINTMENT (OUTPATIENT)
Dept: GENERAL RADIOLOGY | Age: 58
DRG: 246 | End: 2018-02-07

## 2018-02-07 LAB
ABSOLUTE EOS #: 0.1 K/UL (ref 0–0.4)
ABSOLUTE IMMATURE GRANULOCYTE: ABNORMAL K/UL (ref 0–0.3)
ABSOLUTE LYMPH #: 2 K/UL (ref 1–4.8)
ABSOLUTE MONO #: 0.4 K/UL (ref 0.2–0.8)
ALBUMIN SERPL-MCNC: 2.9 G/DL (ref 3.5–5.2)
ALBUMIN/GLOBULIN RATIO: ABNORMAL (ref 1–2.5)
ALP BLD-CCNC: 51 U/L (ref 40–129)
ALT SERPL-CCNC: 42 U/L (ref 5–41)
ANION GAP SERPL CALCULATED.3IONS-SCNC: 11 MMOL/L (ref 9–17)
AST SERPL-CCNC: 46 U/L
BASOPHILS # BLD: 1 % (ref 0–2)
BASOPHILS ABSOLUTE: 0.1 K/UL (ref 0–0.2)
BILIRUB SERPL-MCNC: 0.42 MG/DL (ref 0.3–1.2)
BILIRUBIN DIRECT: 0.12 MG/DL
BILIRUBIN, INDIRECT: 0.3 MG/DL (ref 0–1)
BNP INTERPRETATION: ABNORMAL
BUN BLDV-MCNC: 21 MG/DL (ref 6–20)
BUN/CREAT BLD: 16 (ref 9–20)
CALCIUM SERPL-MCNC: 7.8 MG/DL (ref 8.6–10.4)
CHLORIDE BLD-SCNC: 105 MMOL/L (ref 98–107)
CO2: 27 MMOL/L (ref 20–31)
CREAT SERPL-MCNC: 1.29 MG/DL (ref 0.7–1.2)
DIFFERENTIAL TYPE: ABNORMAL
EKG ATRIAL RATE: 82 BPM
EKG ATRIAL RATE: 93 BPM
EKG P AXIS: 41 DEGREES
EKG P AXIS: 70 DEGREES
EKG P-R INTERVAL: 186 MS
EKG P-R INTERVAL: 192 MS
EKG Q-T INTERVAL: 370 MS
EKG Q-T INTERVAL: 378 MS
EKG QRS DURATION: 90 MS
EKG QRS DURATION: 92 MS
EKG QTC CALCULATION (BAZETT): 441 MS
EKG QTC CALCULATION (BAZETT): 460 MS
EKG R AXIS: 160 DEGREES
EKG R AXIS: 47 DEGREES
EKG T AXIS: 156 DEGREES
EKG T AXIS: 168 DEGREES
EKG VENTRICULAR RATE: 82 BPM
EKG VENTRICULAR RATE: 93 BPM
EOSINOPHILS RELATIVE PERCENT: 1 % (ref 1–4)
GFR AFRICAN AMERICAN: >60 ML/MIN
GFR NON-AFRICAN AMERICAN: 57 ML/MIN
GFR SERPL CREATININE-BSD FRML MDRD: ABNORMAL ML/MIN/{1.73_M2}
GFR SERPL CREATININE-BSD FRML MDRD: ABNORMAL ML/MIN/{1.73_M2}
GLOBULIN: ABNORMAL G/DL (ref 1.5–3.8)
GLUCOSE BLD-MCNC: 93 MG/DL (ref 70–99)
HCT VFR BLD CALC: 38 % (ref 41–53)
HEMOGLOBIN: 12.4 G/DL (ref 13.5–17.5)
IMMATURE GRANULOCYTES: ABNORMAL %
LYMPHOCYTES # BLD: 28 % (ref 24–44)
MAGNESIUM: 2 MG/DL (ref 1.6–2.6)
MCH RBC QN AUTO: 30.2 PG (ref 26–34)
MCHC RBC AUTO-ENTMCNC: 32.6 G/DL (ref 31–37)
MCV RBC AUTO: 92.6 FL (ref 80–100)
MONOCYTES # BLD: 5 % (ref 1–7)
MYOGLOBIN: 42 NG/ML (ref 28–72)
NRBC AUTOMATED: ABNORMAL PER 100 WBC
PARTIAL THROMBOPLASTIN TIME: 26.1 SEC (ref 23–31)
PARTIAL THROMBOPLASTIN TIME: 31.3 SEC (ref 23–31)
PARTIAL THROMBOPLASTIN TIME: 32.6 SEC (ref 23–31)
PDW BLD-RTO: 13 % (ref 11.5–14.5)
PLATELET # BLD: 164 K/UL (ref 130–400)
PLATELET ESTIMATE: ABNORMAL
PMV BLD AUTO: ABNORMAL FL (ref 6–12)
POTASSIUM SERPL-SCNC: 3.2 MMOL/L (ref 3.7–5.3)
POTASSIUM SERPL-SCNC: 4 MMOL/L (ref 3.7–5.3)
PRO-BNP: 3250 PG/ML
RBC # BLD: 4.11 M/UL (ref 4.5–5.9)
RBC # BLD: ABNORMAL 10*6/UL
SEG NEUTROPHILS: 65 % (ref 36–66)
SEGMENTED NEUTROPHILS ABSOLUTE COUNT: 4.6 K/UL (ref 1.8–7.7)
SODIUM BLD-SCNC: 143 MMOL/L (ref 135–144)
TOTAL PROTEIN: 5.3 G/DL (ref 6.4–8.3)
TROPONIN INTERP: ABNORMAL
TROPONIN T: 3.6 NG/ML
TROPONIN T: 4.11 NG/ML
TROPONIN T: 6 NG/ML
TROPONIN T: 6.45 NG/ML
WBC # BLD: 7.2 K/UL (ref 3.5–11)
WBC # BLD: ABNORMAL 10*3/UL

## 2018-02-07 PROCEDURE — 2580000003 HC RX 258: Performed by: INTERNAL MEDICINE

## 2018-02-07 PROCEDURE — 6370000000 HC RX 637 (ALT 250 FOR IP): Performed by: INTERNAL MEDICINE

## 2018-02-07 PROCEDURE — 80048 BASIC METABOLIC PNL TOTAL CA: CPT

## 2018-02-07 PROCEDURE — 84132 ASSAY OF SERUM POTASSIUM: CPT

## 2018-02-07 PROCEDURE — 94660 CPAP INITIATION&MGMT: CPT

## 2018-02-07 PROCEDURE — 94640 AIRWAY INHALATION TREATMENT: CPT

## 2018-02-07 PROCEDURE — 85730 THROMBOPLASTIN TIME PARTIAL: CPT

## 2018-02-07 PROCEDURE — 97530 THERAPEUTIC ACTIVITIES: CPT

## 2018-02-07 PROCEDURE — 93308 TTE F-UP OR LMTD: CPT

## 2018-02-07 PROCEDURE — 36556 INSERT NON-TUNNEL CV CATH: CPT

## 2018-02-07 PROCEDURE — 85025 COMPLETE CBC W/AUTO DIFF WBC: CPT

## 2018-02-07 PROCEDURE — 84484 ASSAY OF TROPONIN QUANT: CPT

## 2018-02-07 PROCEDURE — 93005 ELECTROCARDIOGRAM TRACING: CPT

## 2018-02-07 PROCEDURE — 2500000003 HC RX 250 WO HCPCS: Performed by: INTERNAL MEDICINE

## 2018-02-07 PROCEDURE — 94664 DEMO&/EVAL PT USE INHALER: CPT

## 2018-02-07 PROCEDURE — 6370000000 HC RX 637 (ALT 250 FOR IP): Performed by: NURSE PRACTITIONER

## 2018-02-07 PROCEDURE — 99232 SBSQ HOSP IP/OBS MODERATE 35: CPT | Performed by: INTERNAL MEDICINE

## 2018-02-07 PROCEDURE — 83735 ASSAY OF MAGNESIUM: CPT

## 2018-02-07 PROCEDURE — 80076 HEPATIC FUNCTION PANEL: CPT

## 2018-02-07 PROCEDURE — 6360000002 HC RX W HCPCS: Performed by: INTERNAL MEDICINE

## 2018-02-07 PROCEDURE — 2060000000 HC ICU INTERMEDIATE R&B

## 2018-02-07 PROCEDURE — 94762 N-INVAS EAR/PLS OXIMTRY CONT: CPT

## 2018-02-07 PROCEDURE — 83874 ASSAY OF MYOGLOBIN: CPT

## 2018-02-07 PROCEDURE — 2700000000 HC OXYGEN THERAPY PER DAY

## 2018-02-07 PROCEDURE — 71045 X-RAY EXAM CHEST 1 VIEW: CPT

## 2018-02-07 PROCEDURE — 97116 GAIT TRAINING THERAPY: CPT

## 2018-02-07 PROCEDURE — 83880 ASSAY OF NATRIURETIC PEPTIDE: CPT

## 2018-02-07 RX ORDER — HEPARIN SODIUM 1000 [USP'U]/ML
5000 INJECTION, SOLUTION INTRAVENOUS; SUBCUTANEOUS ONCE
Status: COMPLETED | OUTPATIENT
Start: 2018-02-07 | End: 2018-02-07

## 2018-02-07 RX ORDER — POTASSIUM CHLORIDE 20MEQ/15ML
40 LIQUID (ML) ORAL PRN
Status: DISCONTINUED | OUTPATIENT
Start: 2018-02-07 | End: 2018-02-09 | Stop reason: HOSPADM

## 2018-02-07 RX ORDER — POTASSIUM CHLORIDE 20 MEQ/1
40 TABLET, EXTENDED RELEASE ORAL PRN
Status: DISCONTINUED | OUTPATIENT
Start: 2018-02-07 | End: 2018-02-09 | Stop reason: HOSPADM

## 2018-02-07 RX ORDER — POTASSIUM CHLORIDE 7.45 MG/ML
10 INJECTION INTRAVENOUS PRN
Status: DISCONTINUED | OUTPATIENT
Start: 2018-02-07 | End: 2018-02-09 | Stop reason: HOSPADM

## 2018-02-07 RX ORDER — HEPARIN SODIUM 1000 [USP'U]/ML
2000 INJECTION, SOLUTION INTRAVENOUS; SUBCUTANEOUS PRN
Status: DISCONTINUED | OUTPATIENT
Start: 2018-02-07 | End: 2018-02-08

## 2018-02-07 RX ORDER — CARVEDILOL 3.12 MG/1
3.12 TABLET ORAL 2 TIMES DAILY WITH MEALS
Status: DISCONTINUED | OUTPATIENT
Start: 2018-02-07 | End: 2018-02-09 | Stop reason: HOSPADM

## 2018-02-07 RX ORDER — HEPARIN SODIUM 10000 [USP'U]/100ML
1000 INJECTION, SOLUTION INTRAVENOUS CONTINUOUS
Status: DISCONTINUED | OUTPATIENT
Start: 2018-02-07 | End: 2018-02-08

## 2018-02-07 RX ORDER — LOSARTAN POTASSIUM 25 MG/1
25 TABLET ORAL DAILY
Status: DISCONTINUED | OUTPATIENT
Start: 2018-02-08 | End: 2018-02-09 | Stop reason: HOSPADM

## 2018-02-07 RX ORDER — CARVEDILOL 6.25 MG/1
6.25 TABLET ORAL 2 TIMES DAILY WITH MEALS
Status: DISCONTINUED | OUTPATIENT
Start: 2018-02-07 | End: 2018-02-07

## 2018-02-07 RX ORDER — HEPARIN SODIUM 1000 [USP'U]/ML
4000 INJECTION, SOLUTION INTRAVENOUS; SUBCUTANEOUS PRN
Status: DISCONTINUED | OUTPATIENT
Start: 2018-02-07 | End: 2018-02-08

## 2018-02-07 RX ORDER — HEPARIN SODIUM 1000 [USP'U]/ML
4000 INJECTION, SOLUTION INTRAVENOUS; SUBCUTANEOUS ONCE
Status: DISCONTINUED | OUTPATIENT
Start: 2018-02-07 | End: 2018-02-07 | Stop reason: DRUGHIGH

## 2018-02-07 RX ADMIN — PREDNISONE 20 MG: 20 TABLET ORAL at 09:23

## 2018-02-07 RX ADMIN — TAZOBACTAM SODIUM AND PIPERACILLIN SODIUM 3.38 G: 375; 3 INJECTION, SOLUTION INTRAVENOUS at 05:42

## 2018-02-07 RX ADMIN — IPRATROPIUM BROMIDE AND ALBUTEROL SULFATE 1 AMPULE: .5; 3 SOLUTION RESPIRATORY (INHALATION) at 10:59

## 2018-02-07 RX ADMIN — CARVEDILOL 3.12 MG: 3.12 TABLET, FILM COATED ORAL at 16:39

## 2018-02-07 RX ADMIN — TICAGRELOR 90 MG: 90 TABLET ORAL at 09:23

## 2018-02-07 RX ADMIN — ASPIRIN 81 MG: 81 TABLET, COATED ORAL at 09:23

## 2018-02-07 RX ADMIN — Medication 10 ML: at 21:45

## 2018-02-07 RX ADMIN — ATORVASTATIN CALCIUM 40 MG: 40 TABLET, FILM COATED ORAL at 21:45

## 2018-02-07 RX ADMIN — MOMETASONE FUROATE AND FORMOTEROL FUMARATE DIHYDRATE 2 PUFF: 200; 5 AEROSOL RESPIRATORY (INHALATION) at 20:36

## 2018-02-07 RX ADMIN — TICAGRELOR 90 MG: 90 TABLET ORAL at 21:45

## 2018-02-07 RX ADMIN — CARVEDILOL 3.12 MG: 3.12 TABLET, FILM COATED ORAL at 09:22

## 2018-02-07 RX ADMIN — IPRATROPIUM BROMIDE AND ALBUTEROL SULFATE 1 AMPULE: .5; 3 SOLUTION RESPIRATORY (INHALATION) at 07:16

## 2018-02-07 RX ADMIN — HEPARIN SODIUM 2000 UNITS: 1000 INJECTION, SOLUTION INTRAVENOUS; SUBCUTANEOUS at 16:17

## 2018-02-07 RX ADMIN — HEPARIN SODIUM 5000 UNITS: 1000 INJECTION, SOLUTION INTRAVENOUS; SUBCUTANEOUS at 10:11

## 2018-02-07 RX ADMIN — POTASSIUM CHLORIDE 40 MEQ: 20 TABLET, EXTENDED RELEASE ORAL at 09:23

## 2018-02-07 RX ADMIN — AMIODARONE HYDROCHLORIDE 200 MG: 200 TABLET ORAL at 09:23

## 2018-02-07 RX ADMIN — IPRATROPIUM BROMIDE AND ALBUTEROL SULFATE 1 AMPULE: .5; 3 SOLUTION RESPIRATORY (INHALATION) at 15:18

## 2018-02-07 RX ADMIN — TAZOBACTAM SODIUM AND PIPERACILLIN SODIUM 3.38 G: 375; 3 INJECTION, SOLUTION INTRAVENOUS at 14:45

## 2018-02-07 RX ADMIN — TAZOBACTAM SODIUM AND PIPERACILLIN SODIUM 3.38 G: 375; 3 INJECTION, SOLUTION INTRAVENOUS at 21:45

## 2018-02-07 RX ADMIN — Medication 10 ML: at 09:23

## 2018-02-07 RX ADMIN — HEPARIN SODIUM AND DEXTROSE 1000 UNITS/HR: 10000; 5 INJECTION INTRAVENOUS at 10:11

## 2018-02-07 RX ADMIN — HEPARIN SODIUM 3000 UNITS: 1000 INJECTION, SOLUTION INTRAVENOUS; SUBCUTANEOUS at 22:51

## 2018-02-07 RX ADMIN — IPRATROPIUM BROMIDE AND ALBUTEROL SULFATE 1 AMPULE: .5; 3 SOLUTION RESPIRATORY (INHALATION) at 20:36

## 2018-02-07 ASSESSMENT — PAIN SCALES - GENERAL
PAINLEVEL_OUTOF10: 0

## 2018-02-07 ASSESSMENT — ENCOUNTER SYMPTOMS: SHORTNESS OF BREATH: 1

## 2018-02-07 NOTE — PLAN OF CARE
Problem: Falls - Risk of  Goal: Absence of falls  Outcome: Met This Shift  Patient remains free of falls. Fall precautions in place. Bed locked in lowest position with side rails up x2. Call light is in reach. Room is free from clutter. Will continue to monitor.

## 2018-02-07 NOTE — PROGRESS NOTES
[facility]    Progress Note    2/7/2018    8:38 AM    Name:   Kuldip Delgado  MRN:     [id number]     Acct:      [de-identified]   Room:   2030/2030-01   Day:  5  Admit Date:  2/2/2018  4:00 PM    PCP:   No primary care provider on file. Code Status:  Full Code    Subjective:     C/C:   Chief Complaint   Patient presents with    Chest Pain     Interval History Status: improved. Patient doing well this AM, sitting in bedside chair. Has no new complaints. Denies any chest pain, SOB. Was able to rest comfortably overnight. Denies any dizziness, lightheadedness. Brief History:     The patient is a 62 y.o.   male who presents with Chest Pain   and he is admitted to the hospital for the management of  STEMI.     He has no significant known co-morbidities. Previously professional football player. Former smoker.      He presented to the ED with chest pain. He had been running on a treadmill and developed substernal chest pain. EKG showed diffuse TONYA and left main disease was suspected. STEMI alert called and patient underwent emergent cath with FREDDIE placed to LAD. Of note, he experienced several runs of NSVT and was started on amio gtt.      Currently he is 3 hours post-cath. Still on mechanical ventilation and sedated. Review of Systems:     Constitutional:  negative for chills, fevers, sweats  Respiratory:  Mild dyspnea on exertion  Cardiovascular:  negative for chest pain, chest pressure/discomfort, lower extremity edema, palpitations  Gastrointestinal:  negative for abdominal pain, constipation, diarrhea, nausea, vomiting  Neurological:  negative for dizziness, headache    Medications: Allergies:     Allergies   Allergen Reactions    Penicillins      Family is unsure what patient's reaction was       Current Meds:   Scheduled Meds:    piperacillin-tazobactam  3.375 g Intravenous Q8H    carvedilol  3.125 mg Oral BID WC    predniSONE  20 mg Oral Daily    amiodarone  200 mg Oral Daily    sodium polystyrene  15 g Oral Once    sodium chloride flush  10 mL Intravenous 2 times per day    losartan  25 mg Oral Daily    atorvastatin  40 mg Oral Nightly    aspirin  81 mg Oral Daily    sodium chloride  500 mL Intravenous Once    ticagrelor  90 mg Oral BID    ipratropium-albuterol  1 ampule Inhalation 4x daily     Continuous Infusions:    vasopressin infusion Stopped (18)    sodium chloride Stopped (18 1810)    norepinephrine Stopped (18 0900)    DOPamine Stopped (18 1349)    propofol Stopped (18)    midazolam Stopped (18)    cisatracurium (NIMBEX) infusion       PRN Meds: potassium chloride **OR** potassium chloride **OR** potassium chloride, dextrose, sodium chloride flush, acetaminophen, magnesium hydroxide, ondansetron, fentanNYL    Data:     Past Medical History:   has a past medical history of Dementia. Social History:   reports that he quit smoking about 6 months ago. His smoking use included Cigarettes. He started smoking about 5 years ago. He has never used smokeless tobacco. He reports that he uses drugs, including Cocaine. He reports that he does not drink alcohol. Family History: No family history on file. Vitals:  /77   Pulse 75   Temp 97.7 °F (36.5 °C) (Temporal)   Resp 19   Ht 6' 5\" (1.956 m)   Wt 298 lb 6.4 oz (135.4 kg)   SpO2 95%   BMI 35.39 kg/m²   Temp (24hrs), Av.3 °F (36.8 °C), Min:97.7 °F (36.5 °C), Max:98.8 °F (37.1 °C)    No results for input(s): POCGLU in the last 72 hours. I/O (24Hr):     Intake/Output Summary (Last 24 hours) at 18 0838  Last data filed at 18 1700   Gross per 24 hour   Intake             1600 ml   Output             1750 ml   Net             -150 ml       Labs:    Hematology:  Recent Labs      18   1344  18   0552   WBC  10.2  7.2   RBC  4.45*  4.11*   HGB  13.8  12.4*   HCT  41.9  38.0*   MCV  94.1  92.6 above.       EXAMINATION: SINGLE VIEW OF THE CHEST   2/2/2018 8:05 pm  Impression New left jugular central venous line in place terminating in the SVC. Otherwise, stable chest demonstrating pulmonary edema.  Endotracheal tube and NG tube remain in place.     EXAMINATION: SINGLE VIEW OF THE CHEST   2/3/2018 8:04 am  Impression 1. No significant change and findings of pulmonary edema.   2. Unchanged positioning of support tubes and lines. Physical Examination:        General appearance:  alert, cooperative and no distress  Mental Status:  Alert, fully oriented  Lungs:  clear to auscultation bilaterally, normal effort  Heart:  regular rate and rhythm, no murmur  Abdomen:  soft, nontender, nondistended, normal bowel sounds, no masses, hepatomegaly, splenomegaly  Extremities:  no edema, redness, tenderness in the calves  Skin:  no gross lesions, rashes, induration    Assessment:        Primary Problem  STEMI (ST elevation myocardial infarction) [facility])    Active Hospital Problems    Diagnosis Date Noted    Ventricular tachycardia ([facility] [address] [I47.2] 02/03/2018    Former smoker [Z87.891] 02/03/2018    Lactic acidosis [E87.2] 02/03/2018    Cardiogenic shock ([address] [R57.0] 02/03/2018    Shock liver [K72.00] 02/03/2018    STEMI (ST elevation myocardial infarction) ([facility] [address] [I21.3] 02/02/2018       Plan:        Elevated troponin, with worsening hypoxic respiratory failure yesterday. Per cardiology team (spoke with Jordy Ayon [place]), starting heparin gtt. Likely repeat cath today. Keep patient NPO. STEMI s/p FREDDIE to LAD. Post Cath was in Cardiogenic Shock. Weaned off of levophed and dopamine. Off of pressors since 2/3/18. Cont ASA, Brilinta, Lipitor, Losartan. HbA1c and Lipid Panel done for risk factor stratification. Both are unremarkable.      Acute hypoxic Respiratory failure, is now s/p extubation on 2/4/18. CXR initially showed pulmonary edema. Was diuresed. Interval CXR shows improvement. Urine output 125 mL/hr. 2/6, worsened. Repeat trop and was found to be elevated from 5.61 to 6.45. Management as per #1. Hypokalemia. Replace per protocol. Hypocalcemia. Replete. Hypoalbuminemia.      Pulmonary edema, resolved. Garbled, nonsensical speech/Altered mentation post extubation on 2/4, resolved. CT Head shows no acute intracranial abnormalities.      VTach. Off of amio gtt. Now on oral amio.      Abnormal LFTs, likely shock liver, resolved. Monitor with repeat labs in AM.      Lactic Acidosis, likely related to ischemia.     Mild acute renal insufficiency, resolved. Patient has good urine output. Araujo discontinued. Repeat BMP in AM.     Former smoker/possible COPD. Per Pulm, cont solumedrol, duoneb, zosyn.      H/O Crack/Cocaine abuse. Has been sober for > 1 year. Gets regular court ordered drug checks. Well-documented sobriety.      GI/DVT PPx.  As per primary    Fiorella Ybarra MD  2/7/2018  8:38 AM

## 2018-02-07 NOTE — FLOWSHEET NOTE
02/07/18 1237   Provider Notification   Reason for Communication Review case   Provider Name Dr. Mami Monaco   Provider Notification Physician   Method of Communication Call   Response No new orders   Notification Time [phone]   MD phoned unit - RN updated MD on patient, echo was competed and awaiting Dr. Brandon Ramirez to review and determine if patient will need cardiac cath today.

## 2018-02-07 NOTE — PROGRESS NOTES
Family is unsure what patient's reaction was     Principal Problem:    STEMI (ST elevation myocardial infarction) [facility])  Active Problems:    Ventricular tachycardia ([facility] [address]    Former smoker    Lactic acidosis    Cardiogenic shock ([facility] [address]    Shock liver  Resolved Problems:    * No resolved hospital problems. *    Blood pressure (!) 141/96, pulse 81, temperature 98.6 °F (37 °C), temperature source Temporal, resp. rate 22, height 6' 5\" (1.956 m), weight 298 lb 6.4 oz (135.4 kg), SpO2 98 %. Subjective:  Symptoms:  Improved. He reports shortness of breath. Diet:  Adequate intake. Activity level: Impaired due to weakness. Pain:  He reports no pain. Objective:  General Appearance:  Well-appearing. Vital signs: (most recent): Blood pressure (!) 141/96, pulse 81, temperature 98.6 °F (37 °C), temperature source Temporal, resp. rate 22, height 6' 5\" (1.956 m), weight 298 lb 6.4 oz (135.4 kg), SpO2 98 %. Vital signs are normal.    Output: Producing urine. HEENT: Normal HEENT exam.    Lungs: There are decreased breath sounds. Heart: Normal rate. Positive for murmur and gallop. Chest: Symmetric chest wall expansion. Abdomen: Abdomen is soft. Bowel sounds are normal.     Neurological: Patient is alert and oriented to person, place and time. Skin:  Warm. Assessment:  (ACS- MI    Cardiogenic shock    TRoponin improving). Plan:   (Adjusting meds  Increasing activity).        Naheed Claudio MD  2/7/2018

## 2018-02-07 NOTE — FLOWSHEET NOTE
02/07/18 2571   Provider Notification   Reason for Communication Review case   Provider Name Jackie Torres   Provider Notification Nurse Practitioner   Method of Communication Face to face   Response See orders   Notification Time    NP rounded, RN updated him on patient orders received from Dr. Marc Barrientos. NP states OK to give AM dose of brilinta and start heparin gtt. RN paged Dr. Seamus Barrientos spoke via phone and states to consult [facility] cardiology for possible need for cardiac cath today. Elizabeth Beck called Dr. Andrew Patton directly and he stated to contact Dr. Mary Trujillo first since he is on call for Fidel Mortensen.

## 2018-02-07 NOTE — PROGRESS NOTES
structures, Functions, Activity limitations Decreased functional mobility ; Decreased ROM; Decreased strength;Decreased endurance;Decreased balance;Decreased safe awareness   Assessment Patient educated on pacing and conservation techniques. Patient is appropraite for discharge home with possible cardiac Rehab. Prognosis Excellent   Discharge Recommendations Home with assist PRN  (Cardiac Rehab)   Activity Tolerance   Activity Tolerance Patient limited by endurance   Plan   Times per week 1-2x/day; 5-6days/wk   Current Treatment Recommendations Strengthening;ROM;Balance Training;Functional Mobility Training;Transfer Training; Endurance Training;Gait Training;Stair training;Home Exercise Program;Safety Education & Training   Safety Devices   Type of devices All fall risk precautions in place;Call light within reach;Gait belt;Patient at risk for falls;Nurse notified; Left in chair     Shraddha Narcisa [place]  Time: 4003-7216

## 2018-02-07 NOTE — FLOWSHEET NOTE
02/07/18 0730   Provider Notification   Reason for Communication Critical Value (comment)  (troponin 6.45)   Provider Name Dr. Sravan Suarez   Provider Notification Physician   Method of Communication Call   Response See orders   Notification Time 0730   MD returned page. RN notified MD of critical troponin result, patient remains asymptomatic. RN notified MD of K 3.2 - MD ordered K replacement. Order for STAT EKG and MD will come review results. See orders.

## 2018-02-07 NOTE — FLOWSHEET NOTE
02/07/18 1608   Provider Notification   Reason for Communication Review case;Critical Value (comment)  (troponin)   Provider Name Dr. Vika Man   Provider Notification Physician   Method of Communication Face to face   Response See orders   Notification Time [phone]   MD rounded - new orders received to hold losartan and coreg if SBP less than 110. RN notified MD of critical troponin, but trending down. MD ordered repeat troponin in AM.  MD states to continue heparin and dose per protocol. See orders.

## 2018-02-07 NOTE — PROGRESS NOTES
[id number] [facility]    Progress Note    2/6/2018    9:16 PM    Name:   Zhen Johnston  MRN:     [id number]     Acct:      [de-identified]   Room:   2030/2030-01  IP Day:  4  Admit Date:  2/2/2018  4:00 PM    PCP:   No primary care provider on file. Code Status:  Full Code    Subjective:     C/C:   Chief Complaint   Patient presents with    Chest Pain     Interval History Status: improved. Became acutely SOB today. Increased O2 per NC. LifeVest fitted. Will stay today. Awaiting repeat trops. Brief History:     The patient is a 62 y.o.   male who presents with Chest Pain   and he is admitted to the hospital for the management of  STEMI.     He has no significant known co-morbidities. Previously professional football player. Former smoker.      He presented to the ED with chest pain. He had been running on a treadmill and developed substernal chest pain. EKG showed diffuse TONYA and left main disease was suspected. STEMI alert called and patient underwent emergent cath with FREDDIE placed to LAD. Of note, he experienced several runs of NSVT and was started on amio gtt.      Currently he is 3 hours post-cath. Still on mechanical ventilation and sedated. Review of Systems:     Constitutional:  negative for chills, fevers, sweats  Respiratory:  +dyspnea on exertion  Cardiovascular:  negative for chest pain, chest pressure/discomfort, lower extremity edema, palpitations  Gastrointestinal:  negative for abdominal pain, constipation, diarrhea, nausea, vomiting  Neurological:  negative for dizziness, headache    Medications: Allergies:     Allergies   Allergen Reactions    Penicillins      Family is unsure what patient's reaction was       Current Meds:   Scheduled Meds:    piperacillin-tazobactam  3.375 g Intravenous Q8H    carvedilol  3.125 mg Oral BID     predniSONE  20 mg Oral Daily    amiodarone  200 mg Oral Daily    sodium polystyrene  15 g Oral Once       GI/DVT PPx.  As per primary    Jostin Brody MD  2/6/2018  9:16 PM

## 2018-02-07 NOTE — PLAN OF CARE
Problem: Falls - Risk of  Goal: Absence of falls  Outcome: Ongoing      Problem: Nutrition  Goal: Optimal nutrition therapy  Nutrition Problem: Inadequate oral intake  Intervention: Food and/or Nutrient Delivery: Continue current diet, ONS not indicated  Nutritional Goals: PO intake to meet >75% of kcal/protein needs, with good tolerance of diet consistency, glycemic control (on steroids)   Outcome: Ongoing

## 2018-02-08 ENCOUNTER — APPOINTMENT (OUTPATIENT)
Dept: GENERAL RADIOLOGY | Age: 58
DRG: 246 | End: 2018-02-08

## 2018-02-08 LAB
ABSOLUTE EOS #: 0.2 K/UL (ref 0–0.4)
ABSOLUTE IMMATURE GRANULOCYTE: ABNORMAL K/UL (ref 0–0.3)
ABSOLUTE LYMPH #: 1.6 K/UL (ref 1–4.8)
ABSOLUTE MONO #: 0.4 K/UL (ref 0.2–0.8)
ALBUMIN SERPL-MCNC: 2.6 G/DL (ref 3.5–5.2)
ALBUMIN/GLOBULIN RATIO: ABNORMAL (ref 1–2.5)
ALP BLD-CCNC: 46 U/L (ref 40–129)
ALT SERPL-CCNC: 33 U/L (ref 5–41)
ANION GAP SERPL CALCULATED.3IONS-SCNC: 10 MMOL/L (ref 9–17)
AST SERPL-CCNC: 33 U/L
BASOPHILS # BLD: 0 % (ref 0–2)
BASOPHILS ABSOLUTE: 0 K/UL (ref 0–0.2)
BILIRUB SERPL-MCNC: 0.6 MG/DL (ref 0.3–1.2)
BILIRUBIN DIRECT: 0.16 MG/DL
BILIRUBIN, INDIRECT: 0.44 MG/DL (ref 0–1)
BUN BLDV-MCNC: 15 MG/DL (ref 6–20)
BUN/CREAT BLD: 14 (ref 9–20)
CALCIUM SERPL-MCNC: 7.8 MG/DL (ref 8.6–10.4)
CHLORIDE BLD-SCNC: 105 MMOL/L (ref 98–107)
CO2: 26 MMOL/L (ref 20–31)
CREAT SERPL-MCNC: 1.09 MG/DL (ref 0.7–1.2)
DIFFERENTIAL TYPE: ABNORMAL
EOSINOPHILS RELATIVE PERCENT: 3 % (ref 1–4)
GFR AFRICAN AMERICAN: >60 ML/MIN
GFR NON-AFRICAN AMERICAN: >60 ML/MIN
GFR SERPL CREATININE-BSD FRML MDRD: ABNORMAL ML/MIN/{1.73_M2}
GFR SERPL CREATININE-BSD FRML MDRD: ABNORMAL ML/MIN/{1.73_M2}
GLOBULIN: ABNORMAL G/DL (ref 1.5–3.8)
GLUCOSE BLD-MCNC: 98 MG/DL (ref 70–99)
HCT VFR BLD CALC: 36.8 % (ref 41–53)
HEMOGLOBIN: 11.9 G/DL (ref 13.5–17.5)
IMMATURE GRANULOCYTES: ABNORMAL %
LYMPHOCYTES # BLD: 23 % (ref 24–44)
MAGNESIUM: 2 MG/DL (ref 1.6–2.6)
MCH RBC QN AUTO: 30.1 PG (ref 26–34)
MCHC RBC AUTO-ENTMCNC: 32.3 G/DL (ref 31–37)
MCV RBC AUTO: 93.1 FL (ref 80–100)
MONOCYTES # BLD: 6 % (ref 1–7)
NRBC AUTOMATED: ABNORMAL PER 100 WBC
PARTIAL THROMBOPLASTIN TIME: 43.7 SEC (ref 23–31)
PDW BLD-RTO: 12.8 % (ref 11.5–14.5)
PLATELET # BLD: 144 K/UL (ref 130–400)
PLATELET ESTIMATE: ABNORMAL
PMV BLD AUTO: ABNORMAL FL (ref 6–12)
POTASSIUM SERPL-SCNC: 3.2 MMOL/L (ref 3.7–5.3)
POTASSIUM SERPL-SCNC: 3.5 MMOL/L (ref 3.7–5.3)
RBC # BLD: 3.96 M/UL (ref 4.5–5.9)
RBC # BLD: ABNORMAL 10*6/UL
SEG NEUTROPHILS: 68 % (ref 36–66)
SEGMENTED NEUTROPHILS ABSOLUTE COUNT: 4.7 K/UL (ref 1.8–7.7)
SODIUM BLD-SCNC: 141 MMOL/L (ref 135–144)
TOTAL PROTEIN: 5.1 G/DL (ref 6.4–8.3)
TROPONIN INTERP: ABNORMAL
TROPONIN T: 3.75 NG/ML
WBC # BLD: 6.9 K/UL (ref 3.5–11)
WBC # BLD: ABNORMAL 10*3/UL

## 2018-02-08 PROCEDURE — 6370000000 HC RX 637 (ALT 250 FOR IP): Performed by: NURSE PRACTITIONER

## 2018-02-08 PROCEDURE — 85730 THROMBOPLASTIN TIME PARTIAL: CPT

## 2018-02-08 PROCEDURE — 6360000002 HC RX W HCPCS: Performed by: INTERNAL MEDICINE

## 2018-02-08 PROCEDURE — 6360000002 HC RX W HCPCS: Performed by: NURSE PRACTITIONER

## 2018-02-08 PROCEDURE — 97530 THERAPEUTIC ACTIVITIES: CPT

## 2018-02-08 PROCEDURE — 2580000003 HC RX 258: Performed by: INTERNAL MEDICINE

## 2018-02-08 PROCEDURE — 71045 X-RAY EXAM CHEST 1 VIEW: CPT

## 2018-02-08 PROCEDURE — 94761 N-INVAS EAR/PLS OXIMETRY MLT: CPT

## 2018-02-08 PROCEDURE — 6370000000 HC RX 637 (ALT 250 FOR IP): Performed by: INTERNAL MEDICINE

## 2018-02-08 PROCEDURE — 83735 ASSAY OF MAGNESIUM: CPT

## 2018-02-08 PROCEDURE — 80076 HEPATIC FUNCTION PANEL: CPT

## 2018-02-08 PROCEDURE — 2500000003 HC RX 250 WO HCPCS: Performed by: INTERNAL MEDICINE

## 2018-02-08 PROCEDURE — 97116 GAIT TRAINING THERAPY: CPT

## 2018-02-08 PROCEDURE — 84132 ASSAY OF SERUM POTASSIUM: CPT

## 2018-02-08 PROCEDURE — 97110 THERAPEUTIC EXERCISES: CPT

## 2018-02-08 PROCEDURE — 94640 AIRWAY INHALATION TREATMENT: CPT

## 2018-02-08 PROCEDURE — 94762 N-INVAS EAR/PLS OXIMTRY CONT: CPT

## 2018-02-08 PROCEDURE — 80048 BASIC METABOLIC PNL TOTAL CA: CPT

## 2018-02-08 PROCEDURE — 85025 COMPLETE CBC W/AUTO DIFF WBC: CPT

## 2018-02-08 PROCEDURE — 2060000000 HC ICU INTERMEDIATE R&B

## 2018-02-08 PROCEDURE — 84484 ASSAY OF TROPONIN QUANT: CPT

## 2018-02-08 RX ORDER — FUROSEMIDE 10 MG/ML
20 INJECTION INTRAMUSCULAR; INTRAVENOUS ONCE
Status: COMPLETED | OUTPATIENT
Start: 2018-02-08 | End: 2018-02-08

## 2018-02-08 RX ORDER — POTASSIUM CHLORIDE 20 MEQ/1
20 TABLET, EXTENDED RELEASE ORAL ONCE
Status: COMPLETED | OUTPATIENT
Start: 2018-02-08 | End: 2018-02-08

## 2018-02-08 RX ORDER — SPIRONOLACTONE 25 MG/1
12.5 TABLET ORAL DAILY
Status: DISCONTINUED | OUTPATIENT
Start: 2018-02-08 | End: 2018-02-09 | Stop reason: HOSPADM

## 2018-02-08 RX ORDER — AMOXICILLIN AND CLAVULANATE POTASSIUM 875; 125 MG/1; MG/1
1 TABLET, FILM COATED ORAL EVERY 12 HOURS SCHEDULED
Status: DISCONTINUED | OUTPATIENT
Start: 2018-02-08 | End: 2018-02-09 | Stop reason: HOSPADM

## 2018-02-08 RX ADMIN — FUROSEMIDE 20 MG: 10 INJECTION, SOLUTION INTRAMUSCULAR; INTRAVENOUS at 09:54

## 2018-02-08 RX ADMIN — LOSARTAN POTASSIUM 25 MG: 25 TABLET ORAL at 08:57

## 2018-02-08 RX ADMIN — IPRATROPIUM BROMIDE AND ALBUTEROL SULFATE 1 AMPULE: .5; 3 SOLUTION RESPIRATORY (INHALATION) at 20:31

## 2018-02-08 RX ADMIN — IPRATROPIUM BROMIDE AND ALBUTEROL SULFATE 1 AMPULE: .5; 3 SOLUTION RESPIRATORY (INHALATION) at 11:27

## 2018-02-08 RX ADMIN — CARVEDILOL 3.12 MG: 3.12 TABLET, FILM COATED ORAL at 17:17

## 2018-02-08 RX ADMIN — MOMETASONE FUROATE AND FORMOTEROL FUMARATE DIHYDRATE 2 PUFF: 200; 5 AEROSOL RESPIRATORY (INHALATION) at 20:31

## 2018-02-08 RX ADMIN — IPRATROPIUM BROMIDE AND ALBUTEROL SULFATE 1 AMPULE: .5; 3 SOLUTION RESPIRATORY (INHALATION) at 07:50

## 2018-02-08 RX ADMIN — AMIODARONE HYDROCHLORIDE 200 MG: 200 TABLET ORAL at 08:55

## 2018-02-08 RX ADMIN — HEPARIN SODIUM AND DEXTROSE 1540 UNITS/HR: 10000; 5 INJECTION INTRAVENOUS at 02:36

## 2018-02-08 RX ADMIN — MOMETASONE FUROATE AND FORMOTEROL FUMARATE DIHYDRATE 2 PUFF: 200; 5 AEROSOL RESPIRATORY (INHALATION) at 07:50

## 2018-02-08 RX ADMIN — POTASSIUM CHLORIDE 20 MEQ: 20 TABLET, EXTENDED RELEASE ORAL at 15:13

## 2018-02-08 RX ADMIN — ASPIRIN 81 MG: 81 TABLET, COATED ORAL at 08:56

## 2018-02-08 RX ADMIN — IPRATROPIUM BROMIDE AND ALBUTEROL SULFATE 1 AMPULE: .5; 3 SOLUTION RESPIRATORY (INHALATION) at 15:53

## 2018-02-08 RX ADMIN — AMOXICILLIN AND CLAVULANATE POTASSIUM 1 TABLET: 875; 125 TABLET, FILM COATED ORAL at 20:38

## 2018-02-08 RX ADMIN — CARVEDILOL 3.12 MG: 3.12 TABLET, FILM COATED ORAL at 08:55

## 2018-02-08 RX ADMIN — PREDNISONE 20 MG: 20 TABLET ORAL at 08:56

## 2018-02-08 RX ADMIN — TAZOBACTAM SODIUM AND PIPERACILLIN SODIUM 3.38 G: 375; 3 INJECTION, SOLUTION INTRAVENOUS at 06:00

## 2018-02-08 RX ADMIN — ATORVASTATIN CALCIUM 40 MG: 40 TABLET, FILM COATED ORAL at 20:15

## 2018-02-08 RX ADMIN — POTASSIUM CHLORIDE 40 MEQ: 20 TABLET, EXTENDED RELEASE ORAL at 06:03

## 2018-02-08 RX ADMIN — TICAGRELOR 90 MG: 90 TABLET ORAL at 20:15

## 2018-02-08 RX ADMIN — TICAGRELOR 90 MG: 90 TABLET ORAL at 08:55

## 2018-02-08 RX ADMIN — Medication 10 ML: at 20:16

## 2018-02-08 RX ADMIN — Medication 10 ML: at 08:56

## 2018-02-08 RX ADMIN — FUROSEMIDE 20 MG: 10 INJECTION, SOLUTION INTRAMUSCULAR; INTRAVENOUS at 15:13

## 2018-02-08 RX ADMIN — SPIRONOLACTONE 12.5 MG: 25 TABLET, FILM COATED ORAL at 17:17

## 2018-02-08 ASSESSMENT — PAIN SCALES - GENERAL
PAINLEVEL_OUTOF10: 0

## 2018-02-08 NOTE — PROGRESS NOTES
ANTIMICROBIAL STEWARDSHIP  Upon 72 hour review, the committee has the following recommendation for your consideration:      Patient is on day #7 of Zosyn. No evidence of infection. Please discontinue zosyn.

## 2018-02-08 NOTE — PROGRESS NOTES
cm)   · Current Body Wt: 298 lb 8.1 oz (135.4 kg)  · Admission Body Wt: 244 lb 14.9 oz (111.1 kg)  · Usual Body Wt:  (No prior wt readings)  · % Weight Change: 21.6%,  6 days (may be in error due to stated admission weight)  · Ideal Body Wt: 185 lb 3 oz (84 kg), % Ideal Body 132%  · BMI Classification: BMI 25.0 - 29.9 Overweight  · Comparative Standards (Estimated Nutrition Needs):  · Estimated Daily Total Kcal: 2,450-2,600 kcal  · Estimated Daily Protein (g):  g  · Estimated Daily Fluids (mL): 2,100-2,200 ml    Estimated Intake vs Estimated Needs: Intake Meets Needs    Nutrition Risk Level: Moderate    Nutrition Interventions:   Continue current diet, ONS not indicated  Continued Inpatient Monitoring, Education Completed, Coordination of Care    Nutrition Evaluation:   · Evaluation: Progressing toward goals   · Goals: PO intake to meet >75% of kcal/protein needs (met), with good tolerance of diet consistency, glycemic control (steroid d/c today)    · Monitoring: Meal Intake, Supplement Intake, Diet Tolerance, Skin Integrity, Wound Healing, Mental Status/Confusion, Weight, Pertinent Labs, Chewing/Swallowing    See Adult Nutrition Doc Flowsheet for more detail.      Electronically signed by Karli OCHOA, SHWETA, JANELLE on 2/8/2018 at 2:21 PM    Contact Number:  [phone]

## 2018-02-08 NOTE — FLOWSHEET NOTE
Alhaji Mayorga, NP arrived to the patient's bedside and was updated on the patient's current status via RN. Reviewed last troponin result of 3.75 from 3.60. Per Alhaji Mayorga, we do not need to order additional troponins. Patient ambulated while Alhaji Mayorga was on the unit and patient continues to have shortness of breath. SpO2 85-89% on RA with ambulation on RA. New orders received to discontinue I.v. Fluids and I.v. Heparin now. Additional orders received for lasix (see order for details), and to re-check K+ at noon. Okay to remove left I.J. Plan for possible discharge home tomorrow.

## 2018-02-08 NOTE — FLOWSHEET NOTE
Dr. Mami Monaco arrived to the patient's bedside and was updated via RN on the patient's current status. New order received for aldactone 12.5 mg daily p.o. With first dose to be given at 1700 tonight. Will continue to monitor.

## 2018-02-08 NOTE — PROGRESS NOTES
Kay Pearson is a 62 y.o. male patient.     Current Facility-Administered Medications   Medication Dose Route Frequency Provider Last Rate Last Dose    potassium chloride (KLOR-CON M) extended release tablet 40 mEq  40 mEq Oral PRN Autumn Agustin MD   40 mEq at 02/08/18 0603    Or    potassium chloride 20 MEQ/15ML (10%) oral solution 40 mEq  40 mEq Oral PRN Autumn Agustin MD        Or    potassium chloride 10 mEq/100 mL IVPB (Peripheral Line)  10 mEq Intravenous PRN Autumn Agustin MD        mometasone-formoterol (DULERA) 200-5 MCG/ACT inhaler 2 puff  2 puff Inhalation BID Tim Mead MD   2 puff at 02/08/18 0750    losartan (COZAAR) tablet 25 mg  25 mg Oral Daily Ellie Gutierrez MD   25 mg at 02/08/18 0857    carvedilol (COREG) tablet 3.125 mg  3.125 mg Oral BID WC Ellie Gutierrez MD   3.125 mg at 02/08/18 0855    piperacillin-tazobactam (ZOSYN) 3.375 g in dextrose 50 mL IVPB extended infusion (premix)  3.375 g Intravenous Jovani Chaudhry MD   Stopped at 02/08/18 1006    predniSONE (DELTASONE) tablet 20 mg  20 mg Oral Daily Lane De Los Santos MD   20 mg at 02/08/18 0856    amiodarone (CORDARONE) tablet 200 mg  200 mg Oral Daily Grisel Martinez, CNP   200 mg at 02/08/18 0855    sodium polystyrene (KAYEXALATE) 15 GM/60ML suspension 15 g  15 g Oral Once Lane De Los Santos MD        dextrose 50 % solution 25 g  25 g Intravenous PRN Lane De Los Santos MD   25 g at 02/03/18 0324    vasopressin 20 Units in dextrose 5 % 100 mL infusion  0.04 Units/min Intravenous Continuous Lane De Los Santos MD   Stopped at 02/04/18 0930    sodium chloride flush 0.9 % injection 10 mL  10 mL Intravenous 2 times per day Haresh Jensen MD   10 mL at 02/08/18 0856    sodium chloride flush 0.9 % injection 10 mL  10 mL Intravenous PRN Haresh Jensen MD        acetaminophen (TYLENOL) tablet 650 mg  650 mg Oral Q4H PRN Haresh Jensen MD        magnesium hydroxide (MILK OF MAGNESIA) 400 MG/5ML suspension 30 mL  30 mL Oral Daily PRN Larry De La Torre MD        ondansetron [facility]) injection 4 mg  4 mg Intravenous Q6H PRN Larry De La Torre MD        atorvastatin (LIPITOR) tablet 40 mg  40 mg Oral Nightly Larry De La Torre MD   40 mg at 02/07/18 2145    norepinephrine (LEVOPHED) 16 mg in dextrose 5 % 250 mL infusion  2 mcg/min Intravenous Continuous Larry De La Torre MD   Stopped at 02/04/18 0900    aspirin EC tablet 81 mg  81 mg Oral Daily Larry De La Torre MD   81 mg at 02/08/18 0856    DOPamine (INTROPIN) 400 mg in dextrose 5 % 250 mL infusion  2.5 mcg/kg/min Intravenous Continuous Larry De La Torre MD   Stopped at 02/04/18 1349    propofol 1000 MG/100ML injection  10 mcg/kg/min Intravenous Titrated Iwona Claudio MD   Stopped at 02/04/18 0930    fentaNYL (SUBLIMAZE) injection 50 mcg  50 mcg Intravenous Q1H PRN Iwona Claudio MD   50 mcg at 02/03/18 1057    0.9 % sodium chloride bolus  500 mL Intravenous Once Cortney Hill MD        midazolam (VERSED) 100 mg in dextrose 5% 100 mL infusion  1 mg/hr Intravenous Continuous Cortney Hill MD   Stopped at 02/02/18 2048    cisatracurium besylate (NIMBEX) 200 mg in sodium chloride 0.9 % 100 mL infusion  2 mcg/kg/min Intravenous Continuous Cortney Hill MD        [facility]) tablet 90 mg  90 mg Oral BID Larry De La Torre MD   90 mg at 02/08/18 0855    ipratropium-albuterol (DUONEB) nebulizer solution 1 ampule  1 ampule Inhalation 4x daily Cortney Hill MD   1 ampule at 02/08/18 1127     Allergies   Allergen Reactions    Penicillins      Family is unsure what patient's reaction was     Principal Problem:    STEMI (ST elevation myocardial infarction) [facility])  Active Problems:    Ventricular tachycardia ([address]    Former smoker    Lactic acidosis    Cardiogenic shock ([facility]    Shock liver  Resolved Problems:    * No resolved hospital problems. *    Blood pressure 101/83, pulse 72, temperature 97.5 °F (36.4 °C), temperature source Temporal, resp.  rate 18, height 6' 5\" (1.956 m), weight

## 2018-02-08 NOTE — PROGRESS NOTES
Occupational Therapy  DATE: 2018    NAME: Clover Benoit  MRN: [id number]   : 1960  Discharge Recommendation:   Home with assist PRN (Cardiac rehab)     18 1027   Restrictions/Precautions   Restrictions/Precautions General Precautions; Fall Risk   Required Braces or Orthoses? No   Position Activity Restriction   Other position/activity restrictions monitor SpO2, IV pole   General   Chart Reviewed Yes   Response to previous treatment Patient with no complaints from previous session   Family / Caregiver Present No   General Comment   Comments No O2, spO2 maintaining <95%, once dropped to 85% with quick recovery   Pain Assessment   Patient Currently in Pain Denies   Orientation   Overall Orientation Status WNL   Attendance   Participation Active participation   Balance   Sitting Balance Independent   Standing Balance Stand by assistance   Standing Balance   Sit to stand Stand by assistance   Stand to sit Stand by assistance   Comment no AD   Transfers   Stand Step Transfers Stand by assistance   Stand Pivot Transfers Stand by assistance   Sit Pivot Transfers Stand by assistance   Cognition   Overall Cognitive Status WNL   Perception   Overall Perceptual Status WFL   Short term goals   Short term goal 1 demo I for ADL transfers. Short term goal 2 demo I functional mobility for ADL completion with good pacing and safety awareness. Short term goal 5 demo and verbalize safety techniques and strategies for WS/EC. Assessment   Performance deficits / Impairments Decreased functional mobility ; Decreased ADL status; Decreased strength;Decreased endurance;Decreased balance   Prognosis Good   Patient Education ecws, DME-reacher   REQUIRES OT FOLLOW UP Yes   Discharge Recommendations Home with assist PRN   Activity Tolerance   Activity Tolerance Patient Tolerated treatment well   Safety Devices   Safety Devices in place Yes   Type of devices Nurse notified; Left in chair;Call light within reach   Other Comments Comments TIME: 5266-4610   Plan   Times per week 4-5x/week, 1-2x/day   Current Treatment Recommendations Strengthening;Balance Training;Functional Mobility Training; Endurance Training; Safety Education & Training;Self-Care / ADL   Equipment Recommendations   Equipment Needed Yes   Upon arrival patient seated chairside. Patient educated on DME of reacher and pulse ox to monitor O2 levels at home. Patient completed sit>stand, mobility into hallway spO2 maintaining <95% throughout treatment then once dropped to 85% and recovered quickly. Patient retires chairside with good understanding of pacing self when spO2. Patient would benefit from continued OT services due to decreased endurance for self care tasks. RN reji 'ed treatment this date. Patient medically stable for OT treatment.   MARION Carlson, LMT, COLT-HILDA

## 2018-02-09 VITALS
OXYGEN SATURATION: 95 % | HEIGHT: 77 IN | WEIGHT: 260.8 LBS | HEART RATE: 81 BPM | DIASTOLIC BLOOD PRESSURE: 81 MMHG | TEMPERATURE: 97.7 F | SYSTOLIC BLOOD PRESSURE: 99 MMHG | BODY MASS INDEX: 30.79 KG/M2 | RESPIRATION RATE: 22 BRPM

## 2018-02-09 LAB
ABSOLUTE EOS #: 0.3 K/UL (ref 0–0.4)
ABSOLUTE IMMATURE GRANULOCYTE: ABNORMAL K/UL (ref 0–0.3)
ABSOLUTE LYMPH #: 1.7 K/UL (ref 1–4.8)
ABSOLUTE MONO #: 0.4 K/UL (ref 0.2–0.8)
ALBUMIN SERPL-MCNC: 3.1 G/DL (ref 3.5–5.2)
ALBUMIN/GLOBULIN RATIO: ABNORMAL (ref 1–2.5)
ALP BLD-CCNC: 50 U/L (ref 40–129)
ALT SERPL-CCNC: 30 U/L (ref 5–41)
ANION GAP SERPL CALCULATED.3IONS-SCNC: 10 MMOL/L (ref 9–17)
AST SERPL-CCNC: 27 U/L
BASOPHILS # BLD: 1 % (ref 0–2)
BASOPHILS ABSOLUTE: 0.1 K/UL (ref 0–0.2)
BILIRUB SERPL-MCNC: 0.5 MG/DL (ref 0.3–1.2)
BILIRUBIN DIRECT: 0.12 MG/DL
BILIRUBIN, INDIRECT: 0.38 MG/DL (ref 0–1)
BUN BLDV-MCNC: 16 MG/DL (ref 6–20)
BUN/CREAT BLD: 14 (ref 9–20)
CALCIUM SERPL-MCNC: 8.3 MG/DL (ref 8.6–10.4)
CHLORIDE BLD-SCNC: 106 MMOL/L (ref 98–107)
CO2: 25 MMOL/L (ref 20–31)
CREAT SERPL-MCNC: 1.14 MG/DL (ref 0.7–1.2)
DIFFERENTIAL TYPE: ABNORMAL
EOSINOPHILS RELATIVE PERCENT: 3 % (ref 1–4)
GFR AFRICAN AMERICAN: >60 ML/MIN
GFR NON-AFRICAN AMERICAN: >60 ML/MIN
GFR SERPL CREATININE-BSD FRML MDRD: ABNORMAL ML/MIN/{1.73_M2}
GFR SERPL CREATININE-BSD FRML MDRD: ABNORMAL ML/MIN/{1.73_M2}
GLOBULIN: ABNORMAL G/DL (ref 1.5–3.8)
GLUCOSE BLD-MCNC: 146 MG/DL (ref 70–99)
HCT VFR BLD CALC: 39.3 % (ref 41–53)
HEMOGLOBIN: 13 G/DL (ref 13.5–17.5)
IMMATURE GRANULOCYTES: ABNORMAL %
LYMPHOCYTES # BLD: 20 % (ref 24–44)
MAGNESIUM: 2.2 MG/DL (ref 1.6–2.6)
MCH RBC QN AUTO: 31.4 PG (ref 26–34)
MCHC RBC AUTO-ENTMCNC: 33.1 G/DL (ref 31–37)
MCV RBC AUTO: 94.6 FL (ref 80–100)
MONOCYTES # BLD: 5 % (ref 1–7)
NRBC AUTOMATED: ABNORMAL PER 100 WBC
PDW BLD-RTO: 13.4 % (ref 11.5–14.5)
PLATELET # BLD: 157 K/UL (ref 130–400)
PLATELET ESTIMATE: ABNORMAL
PMV BLD AUTO: 9.4 FL (ref 6–12)
POTASSIUM SERPL-SCNC: 3.4 MMOL/L (ref 3.7–5.3)
RBC # BLD: 4.15 M/UL (ref 4.5–5.9)
RBC # BLD: ABNORMAL 10*6/UL
SEG NEUTROPHILS: 71 % (ref 36–66)
SEGMENTED NEUTROPHILS ABSOLUTE COUNT: 6.1 K/UL (ref 1.8–7.7)
SODIUM BLD-SCNC: 141 MMOL/L (ref 135–144)
TOTAL PROTEIN: 5.7 G/DL (ref 6.4–8.3)
WBC # BLD: 8.6 K/UL (ref 3.5–11)
WBC # BLD: ABNORMAL 10*3/UL

## 2018-02-09 PROCEDURE — 2580000003 HC RX 258: Performed by: INTERNAL MEDICINE

## 2018-02-09 PROCEDURE — 6370000000 HC RX 637 (ALT 250 FOR IP): Performed by: INTERNAL MEDICINE

## 2018-02-09 PROCEDURE — 97116 GAIT TRAINING THERAPY: CPT

## 2018-02-09 PROCEDURE — 94640 AIRWAY INHALATION TREATMENT: CPT

## 2018-02-09 PROCEDURE — 6370000000 HC RX 637 (ALT 250 FOR IP): Performed by: NURSE PRACTITIONER

## 2018-02-09 RX ORDER — ATORVASTATIN CALCIUM 40 MG/1
40 TABLET, FILM COATED ORAL NIGHTLY
Qty: 30 TABLET | Refills: 3 | Status: SHIPPED | OUTPATIENT
Start: 2018-02-09 | End: 2018-06-15 | Stop reason: SDUPTHER

## 2018-02-09 RX ORDER — PREDNISONE 20 MG/1
20 TABLET ORAL DAILY
Qty: 3 TABLET | Refills: 0
Start: 2018-02-10 | End: 2018-02-13

## 2018-02-09 RX ORDER — AMOXICILLIN AND CLAVULANATE POTASSIUM 875; 125 MG/1; MG/1
1 TABLET, FILM COATED ORAL EVERY 12 HOURS
Qty: 8 TABLET | Refills: 0
Start: 2018-02-09 | End: 2018-02-19

## 2018-02-09 RX ORDER — PREDNISONE 10 MG/1
10 TABLET ORAL DAILY
Qty: 3 TABLET | Refills: 0
Start: 2018-02-13 | End: 2018-02-16

## 2018-02-09 RX ORDER — ACETAMINOPHEN 325 MG/1
650 TABLET ORAL EVERY 4 HOURS PRN
Qty: 120 TABLET | Refills: 3 | Status: SHIPPED | OUTPATIENT
Start: 2018-02-09 | End: 2019-07-22

## 2018-02-09 RX ORDER — LOSARTAN POTASSIUM 25 MG/1
25 TABLET ORAL DAILY
Qty: 30 TABLET | Refills: 3 | Status: SHIPPED | OUTPATIENT
Start: 2018-02-10 | End: 2018-03-29 | Stop reason: ALTCHOICE

## 2018-02-09 RX ORDER — AMIODARONE HYDROCHLORIDE 200 MG/1
200 TABLET ORAL DAILY
Qty: 30 TABLET | Refills: 3 | Status: SHIPPED | OUTPATIENT
Start: 2018-02-10 | End: 2018-06-18 | Stop reason: SDUPTHER

## 2018-02-09 RX ORDER — SPIRONOLACTONE 25 MG/1
12.5 TABLET ORAL DAILY
Qty: 30 TABLET | Refills: 3 | Status: SHIPPED | OUTPATIENT
Start: 2018-02-10 | End: 2018-06-15 | Stop reason: SDUPTHER

## 2018-02-09 RX ORDER — ASPIRIN 81 MG/1
81 TABLET ORAL DAILY
Qty: 30 TABLET | Refills: 3 | Status: SHIPPED | OUTPATIENT
Start: 2018-02-10 | End: 2018-06-18 | Stop reason: SDUPTHER

## 2018-02-09 RX ORDER — POTASSIUM CHLORIDE 20 MEQ/1
40 TABLET, EXTENDED RELEASE ORAL ONCE
Status: COMPLETED | OUTPATIENT
Start: 2018-02-09 | End: 2018-02-09

## 2018-02-09 RX ORDER — CARVEDILOL 3.12 MG/1
3.12 TABLET ORAL 2 TIMES DAILY WITH MEALS
Qty: 60 TABLET | Refills: 3 | Status: SHIPPED | OUTPATIENT
Start: 2018-02-09 | End: 2018-06-15 | Stop reason: SDUPTHER

## 2018-02-09 RX ADMIN — SPIRONOLACTONE 12.5 MG: 25 TABLET, FILM COATED ORAL at 08:58

## 2018-02-09 RX ADMIN — IPRATROPIUM BROMIDE AND ALBUTEROL SULFATE 1 AMPULE: .5; 3 SOLUTION RESPIRATORY (INHALATION) at 07:59

## 2018-02-09 RX ADMIN — ASPIRIN 81 MG: 81 TABLET, COATED ORAL at 08:58

## 2018-02-09 RX ADMIN — MOMETASONE FUROATE AND FORMOTEROL FUMARATE DIHYDRATE 2 PUFF: 200; 5 AEROSOL RESPIRATORY (INHALATION) at 08:01

## 2018-02-09 RX ADMIN — TICAGRELOR 90 MG: 90 TABLET ORAL at 08:58

## 2018-02-09 RX ADMIN — POTASSIUM CHLORIDE 40 MEQ: 20 TABLET, EXTENDED RELEASE ORAL at 11:18

## 2018-02-09 RX ADMIN — AMOXICILLIN AND CLAVULANATE POTASSIUM 1 TABLET: 875; 125 TABLET, FILM COATED ORAL at 09:01

## 2018-02-09 RX ADMIN — CARVEDILOL 3.12 MG: 3.12 TABLET, FILM COATED ORAL at 08:58

## 2018-02-09 RX ADMIN — IPRATROPIUM BROMIDE AND ALBUTEROL SULFATE 1 AMPULE: .5; 3 SOLUTION RESPIRATORY (INHALATION) at 11:38

## 2018-02-09 RX ADMIN — AMIODARONE HYDROCHLORIDE 200 MG: 200 TABLET ORAL at 08:58

## 2018-02-09 RX ADMIN — LOSARTAN POTASSIUM 25 MG: 25 TABLET ORAL at 09:01

## 2018-02-09 RX ADMIN — Medication 10 ML: at 08:59

## 2018-02-09 RX ADMIN — PREDNISONE 20 MG: 20 TABLET ORAL at 08:58

## 2018-02-09 ASSESSMENT — PAIN SCALES - GENERAL
PAINLEVEL_OUTOF10: 0

## 2018-02-09 NOTE — PROGRESS NOTES
Physical Therapy  Facility/Department: [facility] CVICU  Daily Treatment Note  NAME: Nohemi Page  : 1960  MRN: [id number]  Discharge Recommendation:   Home with assist PRN (Cardiac rehab)  Date of Service: 2018    Patient Diagnosis(es):   Patient Active Problem List    Diagnosis Date Noted    Ventricular tachycardia ([facility] 2018    Former smoker 2018    Lactic acidosis 2018    Cardiogenic shock ([facility] 2018    Shock liver 2018    STEMI (ST elevation myocardial infarction) ([facility] 2018       Past Medical History:   Diagnosis Date    Dementia      No past surgical history on file. Restrictions  Restrictions/Precautions  Restrictions/Precautions: General Precautions, Fall Risk  Required Braces or Orthoses?: No  Position Activity Restriction  Other position/activity restrictions: monitor SpO2  Subjective   General  Chart Reviewed: Yes  Family / Caregiver Present: No  Subjective  Subjective: Patient reports he is going home today, agreeable to PT. General Comment  Comments: RN reports patient is medically stable for PT treatment. Pain Screening  Patient Currently in Pain: Denies  Vital Signs  Patient Currently in Pain: Denies       Orientation  Orientation  Overall Orientation Status: Within Functional Limits  Objective      Transfers  Sit to Stand: Supervision  Stand to sit: Supervision  Bed to Chair: Supervision  Stand Pivot Transfers: Supervision  Lateral Transfers: Supervision  Ambulation  Ambulation?: Yes  Ambulation 1  Surface: level tile  Assistance: Stand by assistance  Distance: 500 feet  Comments: SPo2 remains 91-99% if patient walks slowly, decreases to 88% if he walks quickly. Stairs/Curb  Stairs?: Yes  Patient negotiate 10 stairs with 1 hand rail and SBA. Patient needed to take 2 rest breaks going down and 3 rest breaks going up.      Balance  Posture: Good  Sitting - Static: Good  Sitting - Dynamic: Good  Standing - Static: Good  Standing - Dynamic: Good;-

## 2018-02-09 NOTE — FLOWSHEET NOTE
Dr. Sekou Riddle arrived to the patient's bedside and was updated on the patient's current status via RN. EKG obtained and reviewed. Patient to remain NPO at this time. Hold lovenox this morning. New order received for troponin to be drawn at 1000. Patient states he no longer takes atenolol. Atenolol discontinued and metoprolol succ er 100 mg p.o. Daily ordered. Awaiting to determine if patient will need a cardiac cath or to continue with medical management. Will continue to monitor closely.

## 2018-02-09 NOTE — DISCHARGE SUMMARY
1245  02/08/18   0448  02/09/18   0903   MG  2.0  2.0  2.2       Disposition:    home    Discharge Medications: Please see discharge med list. Pulmonary/critical care to decide if Augmentin, Prednisone, and Dulera needed upon discharge. Activity:   activity as tolerated  Diet:     cardiac diet  Follow Up:   5-7 days     Nursing to provide further education on Life Vest and CHF education  F/u in office [address] [place] [address] Nw and Vascular next week  Patient to f/u with pulmonary 2-3 weeks post discharge      Electronically signed by:  Magi Castro CNP  ____________________________________________  [address] [facility]  [address], [address] [place] [address]  Tel:  [phone]      Fax:  [phone]  [email or web address]

## 2018-02-09 NOTE — FLOWSHEET NOTE
RN arrived to the patient's room after patient showered and recognized that the patient's John Me was alarming. RN remains at the bedside and followed instructions on the device. Patient contacted Shannon Ramirez via telephone. Device working appropriately. Patient educated on proper use of device while showering and reminded to unplug battery when vest is not being worn. Patient verbalizes understanding.

## 2018-02-09 NOTE — FLOWSHEET NOTE
Patient discharged with all belongings to main entrance via wheelchair, accompanied by unit huc/pct and family. Patient reminded to stop at [address] to retrieve medications. Patient verbalizes understanding.

## 2018-02-09 NOTE — FLOWSHEET NOTE
RN reviewed discharge instructions with the patient and patient's family at the bedside. Patient provided with scripts and supplemental education pertaining to zoll lifevest, post-op cardiac cath and intervention activity restrictions, medications, mynx brochure, and stent card. Discussed medication side effects and increased risk for bleeding. Patient informed of follow-up appointments to be scheduled, as well as to have a BMP drawn at an outpatient lab on 2/12/18. Patient verbalizes understanding of discharge instructions.

## 2018-02-09 NOTE — FLOWSHEET NOTE
Dr. Mami Monaco called the unit and was updated on the patient's current status via RN. Patient is okay for discharge. Follow-up with Dr. Saray Bustamante in 1 week.

## 2018-02-16 PROBLEM — I47.20 VENTRICULAR TACHYCARDIA: Chronic | Status: ACTIVE | Noted: 2018-02-03

## 2018-02-16 PROBLEM — I50.22 CHRONIC SYSTOLIC CHF (CONGESTIVE HEART FAILURE) (HCC): Status: ACTIVE | Noted: 2018-02-16

## 2018-02-16 PROBLEM — I21.3 STEMI (ST ELEVATION MYOCARDIAL INFARCTION) (HCC): Chronic | Status: ACTIVE | Noted: 2018-02-02

## 2018-02-16 PROBLEM — R93.1 ABNORMAL ECHOCARDIOGRAM: Status: ACTIVE | Noted: 2018-02-16

## 2018-02-16 PROBLEM — I25.10 CORONARY ARTERY DISEASE INVOLVING NATIVE CORONARY ARTERY OF NATIVE HEART WITHOUT ANGINA PECTORIS: Status: ACTIVE | Noted: 2018-02-16

## 2018-03-29 PROBLEM — G56.21 ULNAR NEUROPATHY OF RIGHT UPPER EXTREMITY: Status: ACTIVE | Noted: 2017-09-16

## 2018-03-29 PROBLEM — M75.02 ADHESIVE CAPSULITIS OF BOTH SHOULDERS: Status: ACTIVE | Noted: 2017-09-16

## 2018-03-29 PROBLEM — M75.01 ADHESIVE CAPSULITIS OF BOTH SHOULDERS: Status: ACTIVE | Noted: 2017-09-16

## 2018-03-29 PROBLEM — S09.90XS: Status: ACTIVE | Noted: 2017-09-16

## 2018-03-29 PROBLEM — M17.11 ARTHRITIS OF RIGHT KNEE: Status: ACTIVE | Noted: 2017-09-16

## 2018-03-29 PROBLEM — G44.321 INTRACTABLE CHRONIC POST-TRAUMATIC HEADACHE: Status: ACTIVE | Noted: 2017-09-16

## 2018-03-29 PROBLEM — F14.11 COCAINE ABUSE IN REMISSION (HCC): Status: ACTIVE | Noted: 2017-09-16

## 2018-03-29 PROBLEM — F33.41 RECURRENT MAJOR DEPRESSIVE DISORDER, IN PARTIAL REMISSION (HCC): Status: ACTIVE | Noted: 2017-09-16

## 2019-04-18 NOTE — PROGRESS NOTES
Has The Growth Been Previously Biopsied?: has been previously biopsied Insert Arterial Line  Date/Time:  02/02/18, 6:25 PM  Performed by: Kelly Flanagan    Patient identity confirmed: arm band and provided demographic data   Time out: Immediately prior to procedure a \"time out\" was called to verify the correct patient, procedure, equipment, support staff. Preparation: Patient was prepped and draped in the usual sterile fashion.     Location:left radial    Geovanny's test normal: yes  Needle gauge: 20     Number of attempts: 1  Post-procedure: transparent dressing applied and line secured    Patient tolerance: well Body Location Override (Optional): Left lateral forehead

## 2020-01-30 PROBLEM — I27.20 PULMONARY HYPERTENSION (HCC): Status: ACTIVE | Noted: 2020-01-30

## 2020-01-30 PROBLEM — I10 ESSENTIAL HYPERTENSION: Status: ACTIVE | Noted: 2020-01-30

## 2020-01-30 PROBLEM — I25.5 ISCHEMIC CARDIOMYOPATHY: Status: ACTIVE | Noted: 2020-01-30
